# Patient Record
Sex: MALE
[De-identification: names, ages, dates, MRNs, and addresses within clinical notes are randomized per-mention and may not be internally consistent; named-entity substitution may affect disease eponyms.]

---

## 2017-04-23 ENCOUNTER — HOSPITAL (OUTPATIENT)
Dept: OTHER | Age: 62
End: 2017-04-23
Attending: EMERGENCY MEDICINE

## 2017-05-18 ENCOUNTER — HOSPITAL (OUTPATIENT)
Dept: OTHER | Age: 62
End: 2017-05-18
Attending: EMERGENCY MEDICINE

## 2017-06-05 ENCOUNTER — APPOINTMENT (OUTPATIENT)
Dept: GENERAL RADIOLOGY | Facility: HOSPITAL | Age: 62
End: 2017-06-05
Attending: EMERGENCY MEDICINE
Payer: MEDICAID

## 2017-06-05 ENCOUNTER — HOSPITAL ENCOUNTER (EMERGENCY)
Facility: HOSPITAL | Age: 62
Discharge: HOME | End: 2017-06-06
Attending: EMERGENCY MEDICINE
Payer: MEDICAID

## 2017-06-05 DIAGNOSIS — R07.89 CHEST PAIN, MUSCULOSKELETAL: Primary | ICD-10-CM

## 2017-06-05 PROCEDURE — 93005 ELECTROCARDIOGRAM TRACING: CPT

## 2017-06-05 PROCEDURE — 36415 COLL VENOUS BLD VENIPUNCTURE: CPT

## 2017-06-05 PROCEDURE — 80048 BASIC METABOLIC PNL TOTAL CA: CPT

## 2017-06-05 PROCEDURE — 84484 ASSAY OF TROPONIN QUANT: CPT

## 2017-06-05 PROCEDURE — 85025 COMPLETE CBC W/AUTO DIFF WBC: CPT

## 2017-06-05 PROCEDURE — 84484 ASSAY OF TROPONIN QUANT: CPT | Performed by: EMERGENCY MEDICINE

## 2017-06-05 PROCEDURE — 99285 EMERGENCY DEPT VISIT HI MDM: CPT

## 2017-06-05 PROCEDURE — 71010 XR CHEST AP PORTABLE  (CPT=71010): CPT | Performed by: EMERGENCY MEDICINE

## 2017-06-05 PROCEDURE — 93010 ELECTROCARDIOGRAM REPORT: CPT | Performed by: EMERGENCY MEDICINE

## 2017-06-05 RX ORDER — IBUPROFEN 600 MG/1
600 TABLET ORAL ONCE
Status: COMPLETED | OUTPATIENT
Start: 2017-06-05 | End: 2017-06-05

## 2017-06-06 VITALS
BODY MASS INDEX: 24.5 KG/M2 | WEIGHT: 175 LBS | DIASTOLIC BLOOD PRESSURE: 71 MMHG | HEIGHT: 71 IN | SYSTOLIC BLOOD PRESSURE: 110 MMHG | RESPIRATION RATE: 18 BRPM | TEMPERATURE: 97 F | OXYGEN SATURATION: 99 % | HEART RATE: 67 BPM

## 2017-06-06 RX ORDER — MELOXICAM 15 MG/1
15 TABLET ORAL DAILY
Qty: 15 TABLET | Refills: 0 | Status: SHIPPED | OUTPATIENT
Start: 2017-06-06 | End: 2017-06-21

## 2017-06-06 NOTE — ED INITIAL ASSESSMENT (HPI)
Chest pain that started at 7pm. Sternal no radiation. Pt has no cardiac hx. Denies sweating, nausea/vomiting or sob.

## 2017-06-06 NOTE — ED NOTES
Pt to ED for c/o mid sternal CP that began around 7pm while he was sitting down. He denies any exertion at the time of onset. He denies any radiation.  He is unsure of how long the CP lasted before it went away but states it came back shortly after it resol

## 2017-06-06 NOTE — ED NOTES
Pt presents to ED via steady gait with c/o mid sternal sharp chest pain while sitting today at 1900- states \"came out of no where. \" Denies sob, f/c, cough, diaphoresis- pt resting on ED cart in no distress. Skin pink warm dry.

## 2017-06-06 NOTE — ED NOTES
Patient to room 31 via wheelchair. Changing into gown and providing urine sample at this time. Blood work collected in triage.

## 2017-06-06 NOTE — ED PROVIDER NOTES
Patient Seen in: [facility] AND [facility] Emergency Department    History   Patient presents with:  Chest Pain Angina (cardiovascular)      HPI    The patient presents complaining of central chest pain that started around 7 PM tonight while he was sitting down 06/05/17 2127 97 %   O2 Device 06/05/17 2159 None (Room air)       Physical Exam   Constitutional: He is oriented to person, place, and time. He appears well-developed and well-nourished. No distress. HENT:   Head: Normocephalic and atraumatic.    Nose: N DRAW BLUE   RAINBOW DRAW GOLD      EKG    Rate, intervals and axes as noted on EKG Report.   Rate: Normal  Rhythm: Sinus Rhythm  Reading: Normal intervals, normal EKG            Preliminary Radiology Report  [facility], [facility]  [phone] - Phone tablet, R-0

## 2017-06-09 ENCOUNTER — HOSPITAL ENCOUNTER (EMERGENCY)
Facility: HOSPITAL | Age: 62
Discharge: HOME | End: 2017-06-10
Attending: EMERGENCY MEDICINE
Payer: MEDICAID

## 2017-06-09 VITALS
OXYGEN SATURATION: 97 % | RESPIRATION RATE: 20 BRPM | TEMPERATURE: 98 F | DIASTOLIC BLOOD PRESSURE: 67 MMHG | BODY MASS INDEX: 24.5 KG/M2 | WEIGHT: 175 LBS | HEIGHT: 71 IN | HEART RATE: 75 BPM | SYSTOLIC BLOOD PRESSURE: 107 MMHG

## 2017-06-09 DIAGNOSIS — S09.93XA DENTAL TRAUMA, INITIAL ENCOUNTER: Primary | ICD-10-CM

## 2017-06-09 PROCEDURE — 99283 EMERGENCY DEPT VISIT LOW MDM: CPT

## 2017-06-09 RX ORDER — TRAMADOL HYDROCHLORIDE 50 MG/1
50 TABLET ORAL EVERY 8 HOURS PRN
Qty: 15 TABLET | Refills: 0 | Status: SHIPPED | OUTPATIENT
Start: 2017-06-09 | End: 2017-06-14

## 2017-06-09 RX ORDER — PENICILLIN V POTASSIUM 500 MG/1
500 TABLET ORAL ONCE
Status: COMPLETED | OUTPATIENT
Start: 2017-06-09 | End: 2017-06-09

## 2017-06-09 RX ORDER — PENICILLIN V POTASSIUM 500 MG/1
500 TABLET ORAL 4 TIMES DAILY
Qty: 40 TABLET | Refills: 0 | Status: SHIPPED | OUTPATIENT
Start: 2017-06-09 | End: 2017-06-19

## 2017-06-10 NOTE — ED INITIAL ASSESSMENT (HPI)
Left lower tooth pain started tonight. Pt denies fever. Pt reports he has throat CA and is due for an operation this coming Monday. Obvious fractured tooth left lower side.

## 2017-06-10 NOTE — ED PROVIDER NOTES
Patient Seen in: [facility] AND [facility] Emergency Department    History   Patient presents with:  Dental Problem (dental)    Stated Complaint: tooth pain     HPI    65 yo M without PMH aside from upcoming thyroidectomy early next week presenting for evaluation DIAGNOSIS: After history and physical exam differential diagnosis was considered for dental fracture. Pulse ox: 99%:Normal on RA, as interpreted by myself    Evaluation for dental trauma without ingestion of same and without other traumatic complaints.

## 2021-01-11 NOTE — ED AVS SNAPSHOT
[facility] Emergency Department    [place] [address]     [address] [place] [address]    Phone:  [phone]    Fax:  [phone]           Mary Michel   MRN: [id number]    Department:  [facility] Emergency Department   Date of Visit:  6/9 Yes, ok for virtual visit. Thanks. and Class Registration line at [phone] or find a doctor online by visiting [email or web address].    IF THERE IS ANY CHANGE OR WORSENING OF YOUR CONDITION, CALL YOUR PRIMARY CARE PHYSICIAN AT ONCE OR RETURN IMMEDIATELY TO [address] [place] [address].     If

## 2025-01-13 ENCOUNTER — HOSPITAL ENCOUNTER (INPATIENT)
Facility: HOSPITAL | Age: 70
LOS: 2 days | Discharge: HOME | End: 2025-01-16
Attending: EMERGENCY MEDICINE | Admitting: STUDENT IN AN ORGANIZED HEALTH CARE EDUCATION/TRAINING PROGRAM
Payer: MEDICARE

## 2025-01-13 ENCOUNTER — APPOINTMENT (OUTPATIENT)
Dept: ULTRASOUND IMAGING | Facility: HOSPITAL | Age: 70
End: 2025-01-13
Attending: STUDENT IN AN ORGANIZED HEALTH CARE EDUCATION/TRAINING PROGRAM
Payer: MEDICARE

## 2025-01-13 ENCOUNTER — APPOINTMENT (OUTPATIENT)
Dept: GENERAL RADIOLOGY | Facility: HOSPITAL | Age: 70
End: 2025-01-13
Attending: EMERGENCY MEDICINE
Payer: MEDICARE

## 2025-01-13 ENCOUNTER — APPOINTMENT (OUTPATIENT)
Dept: CT IMAGING | Facility: HOSPITAL | Age: 70
End: 2025-01-13
Attending: EMERGENCY MEDICINE
Payer: MEDICARE

## 2025-01-13 DIAGNOSIS — I26.99 OTHER ACUTE PULMONARY EMBOLISM WITHOUT ACUTE COR PULMONALE (HCC): Primary | ICD-10-CM

## 2025-01-13 LAB
ANION GAP SERPL CALC-SCNC: 11 MMOL/L (ref 0–18)
APTT PPP: 32.6 SECONDS (ref 23–36)
BASOPHILS # BLD AUTO: 0.05 X10(3) UL (ref 0–0.2)
BASOPHILS NFR BLD AUTO: 0.3 %
BUN BLD-MCNC: 24 MG/DL (ref 9–23)
BUN/CREAT SERPL: 21.4 (ref 10–20)
CALCIUM BLD-MCNC: 9.3 MG/DL (ref 8.7–10.4)
CHLORIDE SERPL-SCNC: 103 MMOL/L (ref 98–112)
CO2 SERPL-SCNC: 24 MMOL/L (ref 21–32)
CREAT BLD-MCNC: 1.12 MG/DL
D DIMER PPP FEU-MCNC: 2.95 UG/ML FEU (ref ?–0.69)
DEPRECATED RDW RBC AUTO: 41.4 FL (ref 35.1–46.3)
EGFRCR SERPLBLD CKD-EPI 2021: 71 ML/MIN/1.73M2 (ref 60–?)
EOSINOPHIL # BLD AUTO: 0.1 X10(3) UL (ref 0–0.7)
EOSINOPHIL NFR BLD AUTO: 0.7 %
ERYTHROCYTE [DISTWIDTH] IN BLOOD BY AUTOMATED COUNT: 13.1 % (ref 11–15)
FLUAV + FLUBV RNA SPEC NAA+PROBE: NEGATIVE
FLUAV + FLUBV RNA SPEC NAA+PROBE: NEGATIVE
GLUCOSE BLD-MCNC: 101 MG/DL (ref 70–99)
HCT VFR BLD AUTO: 41.9 %
HGB BLD-MCNC: 14.7 G/DL
IMM GRANULOCYTES # BLD AUTO: 0.17 X10(3) UL (ref 0–1)
IMM GRANULOCYTES NFR BLD: 1.2 %
LYMPHOCYTES # BLD AUTO: 2.34 X10(3) UL (ref 1–4)
LYMPHOCYTES NFR BLD AUTO: 16.3 %
MCH RBC QN AUTO: 30.5 PG (ref 26–34)
MCHC RBC AUTO-ENTMCNC: 35.1 G/DL (ref 31–37)
MCV RBC AUTO: 86.9 FL
MONOCYTES # BLD AUTO: 1.78 X10(3) UL (ref 0.1–1)
MONOCYTES NFR BLD AUTO: 12.4 %
NEUTROPHILS # BLD AUTO: 9.96 X10 (3) UL (ref 1.5–7.7)
NEUTROPHILS # BLD AUTO: 9.96 X10(3) UL (ref 1.5–7.7)
NEUTROPHILS NFR BLD AUTO: 69.1 %
OSMOLALITY SERPL CALC.SUM OF ELEC: 290 MOSM/KG (ref 275–295)
PLATELET # BLD AUTO: 265 10(3)UL (ref 150–450)
POTASSIUM SERPL-SCNC: 3.5 MMOL/L (ref 3.5–5.1)
RBC # BLD AUTO: 4.82 X10(6)UL
RSV RNA SPEC NAA+PROBE: NEGATIVE
SARS-COV-2 RNA RESP QL NAA+PROBE: NOT DETECTED
SODIUM SERPL-SCNC: 138 MMOL/L (ref 136–145)
TROPONIN I SERPL HS-MCNC: <3 NG/L
WBC # BLD AUTO: 14.4 X10(3) UL (ref 4–11)

## 2025-01-13 PROCEDURE — 99223 1ST HOSP IP/OBS HIGH 75: CPT | Performed by: STUDENT IN AN ORGANIZED HEALTH CARE EDUCATION/TRAINING PROGRAM

## 2025-01-13 PROCEDURE — 93970 EXTREMITY STUDY: CPT | Performed by: STUDENT IN AN ORGANIZED HEALTH CARE EDUCATION/TRAINING PROGRAM

## 2025-01-13 PROCEDURE — 71045 X-RAY EXAM CHEST 1 VIEW: CPT | Performed by: EMERGENCY MEDICINE

## 2025-01-13 PROCEDURE — 71260 CT THORAX DX C+: CPT | Performed by: EMERGENCY MEDICINE

## 2025-01-13 RX ORDER — PREDNISONE 20 MG/1
60 TABLET ORAL ONCE
Status: COMPLETED | OUTPATIENT
Start: 2025-01-13 | End: 2025-01-13

## 2025-01-13 RX ORDER — IPRATROPIUM BROMIDE AND ALBUTEROL SULFATE 2.5; .5 MG/3ML; MG/3ML
3 SOLUTION RESPIRATORY (INHALATION) ONCE
Status: COMPLETED | OUTPATIENT
Start: 2025-01-13 | End: 2025-01-13

## 2025-01-13 RX ORDER — HEPARIN SODIUM AND DEXTROSE 10000; 5 [USP'U]/100ML; G/100ML
18 INJECTION INTRAVENOUS ONCE
Status: COMPLETED | OUTPATIENT
Start: 2025-01-13 | End: 2025-01-14

## 2025-01-13 RX ORDER — HEPARIN SODIUM 1000 [USP'U]/ML
80 INJECTION, SOLUTION INTRAVENOUS; SUBCUTANEOUS ONCE
Status: COMPLETED | OUTPATIENT
Start: 2025-01-13 | End: 2025-01-13

## 2025-01-13 RX ORDER — HEPARIN SODIUM AND DEXTROSE 10000; 5 [USP'U]/100ML; G/100ML
INJECTION INTRAVENOUS CONTINUOUS
Status: DISCONTINUED | OUTPATIENT
Start: 2025-01-14 | End: 2025-01-15

## 2025-01-14 ENCOUNTER — APPOINTMENT (OUTPATIENT)
Dept: CV DIAGNOSTICS | Facility: HOSPITAL | Age: 70
End: 2025-01-14
Attending: STUDENT IN AN ORGANIZED HEALTH CARE EDUCATION/TRAINING PROGRAM
Payer: MEDICARE

## 2025-01-14 PROBLEM — I26.99 OTHER ACUTE PULMONARY EMBOLISM WITHOUT ACUTE COR PULMONALE (HCC): Status: ACTIVE | Noted: 2025-01-14

## 2025-01-14 PROBLEM — I26.99 PULMONARY EMBOLISM (HCC): Status: ACTIVE | Noted: 2025-01-14

## 2025-01-14 LAB
ANION GAP SERPL CALC-SCNC: 12 MMOL/L (ref 0–18)
APTT PPP: 114.4 SECONDS (ref 23–36)
APTT PPP: 57.1 SECONDS (ref 23–36)
APTT PPP: 62 SECONDS (ref 23–36)
ATRIAL RATE: 92 BPM
BASOPHILS # BLD AUTO: 0.04 X10(3) UL (ref 0–0.2)
BASOPHILS NFR BLD AUTO: 0.3 %
BUN BLD-MCNC: 24 MG/DL (ref 9–23)
BUN/CREAT SERPL: 22.4 (ref 10–20)
CALCIUM BLD-MCNC: 9.5 MG/DL (ref 8.7–10.4)
CHLORIDE SERPL-SCNC: 104 MMOL/L (ref 98–112)
CO2 SERPL-SCNC: 25 MMOL/L (ref 21–32)
CREAT BLD-MCNC: 1.07 MG/DL
DEPRECATED RDW RBC AUTO: 41 FL (ref 35.1–46.3)
EGFRCR SERPLBLD CKD-EPI 2021: 75 ML/MIN/1.73M2 (ref 60–?)
EOSINOPHIL # BLD AUTO: 0 X10(3) UL (ref 0–0.7)
EOSINOPHIL NFR BLD AUTO: 0 %
ERYTHROCYTE [DISTWIDTH] IN BLOOD BY AUTOMATED COUNT: 13.2 % (ref 11–15)
GLUCOSE BLD-MCNC: 145 MG/DL (ref 70–99)
HCT VFR BLD AUTO: 40.9 %
HCT VFR BLD AUTO: 41.6 %
HGB BLD-MCNC: 14.3 G/DL
HGB BLD-MCNC: 14.4 G/DL
IMM GRANULOCYTES # BLD AUTO: 0.12 X10(3) UL (ref 0–1)
IMM GRANULOCYTES NFR BLD: 1 %
LYMPHOCYTES # BLD AUTO: 1.13 X10(3) UL (ref 1–4)
LYMPHOCYTES NFR BLD AUTO: 9.3 %
MCH RBC QN AUTO: 29.2 PG (ref 26–34)
MCHC RBC AUTO-ENTMCNC: 34.4 G/DL (ref 31–37)
MCV RBC AUTO: 84.9 FL
MONOCYTES # BLD AUTO: 0.42 X10(3) UL (ref 0.1–1)
MONOCYTES NFR BLD AUTO: 3.5 %
NEUTROPHILS # BLD AUTO: 10.4 X10 (3) UL (ref 1.5–7.7)
NEUTROPHILS # BLD AUTO: 10.4 X10(3) UL (ref 1.5–7.7)
NEUTROPHILS NFR BLD AUTO: 85.9 %
OSMOLALITY SERPL CALC.SUM OF ELEC: 299 MOSM/KG (ref 275–295)
P AXIS: 21 DEGREES
P-R INTERVAL: 154 MS
PLATELET # BLD AUTO: 304 10(3)UL (ref 150–450)
POTASSIUM SERPL-SCNC: 3.8 MMOL/L (ref 3.5–5.1)
Q-T INTERVAL: 398 MS
QRS DURATION: 136 MS
QTC CALCULATION (BEZET): 492 MS
R AXIS: -26 DEGREES
RBC # BLD AUTO: 4.9 X10(6)UL
SODIUM SERPL-SCNC: 141 MMOL/L (ref 136–145)
T AXIS: 1 DEGREES
TSI SER-ACNC: 1.86 UIU/ML (ref 0.55–4.78)
VENTRICULAR RATE: 92 BPM
WBC # BLD AUTO: 12.1 X10(3) UL (ref 4–11)

## 2025-01-14 PROCEDURE — 99233 SBSQ HOSP IP/OBS HIGH 50: CPT | Performed by: FAMILY MEDICINE

## 2025-01-14 PROCEDURE — 93306 TTE W/DOPPLER COMPLETE: CPT | Performed by: STUDENT IN AN ORGANIZED HEALTH CARE EDUCATION/TRAINING PROGRAM

## 2025-01-14 PROCEDURE — 99223 1ST HOSP IP/OBS HIGH 75: CPT | Performed by: INTERNAL MEDICINE

## 2025-01-14 RX ORDER — POTASSIUM CHLORIDE 1500 MG/1
40 TABLET, EXTENDED RELEASE ORAL ONCE
Status: COMPLETED | OUTPATIENT
Start: 2025-01-14 | End: 2025-01-14

## 2025-01-14 RX ORDER — IPRATROPIUM BROMIDE AND ALBUTEROL SULFATE 2.5; .5 MG/3ML; MG/3ML
3 SOLUTION RESPIRATORY (INHALATION) EVERY 6 HOURS PRN
Status: DISCONTINUED | OUTPATIENT
Start: 2025-01-14 | End: 2025-01-16

## 2025-01-14 RX ORDER — LEVOTHYROXINE SODIUM 112 UG/1
112 TABLET ORAL
Status: DISCONTINUED | OUTPATIENT
Start: 2025-01-14 | End: 2025-01-16

## 2025-01-14 RX ORDER — LEVOTHYROXINE SODIUM 112 UG/1
112 TABLET ORAL
COMMUNITY

## 2025-01-14 RX ORDER — ACETAMINOPHEN 325 MG/1
650 TABLET ORAL EVERY 6 HOURS PRN
Status: DISCONTINUED | OUTPATIENT
Start: 2025-01-14 | End: 2025-01-16

## 2025-01-14 RX ORDER — ONDANSETRON 2 MG/ML
4 INJECTION INTRAMUSCULAR; INTRAVENOUS EVERY 6 HOURS PRN
Status: DISCONTINUED | OUTPATIENT
Start: 2025-01-14 | End: 2025-01-16

## 2025-01-14 NOTE — ED PROVIDER NOTES
Patient Seen in: [facility] Emergency Department    History     Chief Complaint   Patient presents with    Cough/URI       HPI    69-year-old male who comes to the emergency department here with 3 days of mild dyspnea, cough with hemoptysis, reports mild chest pain that is right-sided that is pleuritic.  Denies any lower extremity edema.  Currently living at a hotel.    History reviewed.   Past Medical History:    Cancer (HCC)    throat    Esophageal reflux       History reviewed. History reviewed. No pertinent surgical history.      Medications :  Prescriptions Prior to Admission[1]     History reviewed. No pertinent family history.    Smoking Status:   Social History     Socioeconomic History    Marital status: Single   Tobacco Use    Smoking status: Never       Constitutional and vital signs reviewed.      Social History and Family History elements reviewed from today, pertinent positives to the presenting problem noted.    Physical Exam     ED Triage Vitals [01/13/25 1953]   /80   Pulse 112   Resp 18   Temp 97 °F (36.1 °C)   Temp src Temporal   SpO2 94 %   O2 Device None (Room air)       All measures to prevent infection transmission during my interaction with the patient were taken. The patient was already wearing a droplet mask on my arrival to the room. Personal protective equipment was worn throughout the duration of the exam.  Handwashing was performed prior to and after the exam.  Stethoscope and any equipment used during my examination was cleaned with super sani-cloth germicidal wipes following the exam.     Physical Exam    General: NAD  Head: Normocephalic and atraumatic.  Mouth/Throat/Ears/Nose: Oropharynx is clear and moist.   Eyes: Conjunctivae and EOM are normal.   Neck: Normal range of motion. Supple.   Cardiovascular: Normal rate, regular rhythm   Respiratory/Chest: wheezing, no tachypnea. Exhibits no tenderness.  Gastrointestinal: Soft, non-tender, non-distended. Bowel sounds are  normal.   Musculoskeletal:No swelling or deformity.   Neurological: Alert and appropriate. No focal deficits.   Skin: Skin is warm and dry. No pallor.  Psychiatric: Has a normal mood and affect.      ED Course        Labs Reviewed   BASIC METABOLIC PANEL (8) - Abnormal; Notable for the following components:       Result Value    Glucose 101 (*)     BUN 24 (*)     BUN/CREA Ratio 21.4 (*)     All other components within normal limits   CBC WITH DIFFERENTIAL WITH PLATELET - Abnormal; Notable for the following components:    WBC 14.4 (*)     Neutrophil Absolute Prelim 9.96 (*)     Neutrophil Absolute 9.96 (*)     Monocyte Absolute 1.78 (*)     All other components within normal limits   D-DIMER - Abnormal; Notable for the following components:    D-Dimer 2.95 (*)     All other components within normal limits   TROPONIN I HIGH SENSITIVITY - Normal   SARS-COV-2/FLU A AND B/RSV BY PCR (GENEXPERT) - Normal    Narrative:     This test is intended for the qualitative detection and differentiation of SARS-CoV-2, influenza A, influenza B, and respiratory syncytial virus (RSV) viral RNA in nasopharyngeal or nares swabs from individuals suspected of respiratory viral infection consistent with COVID-19 by their healthcare provider. Signs and symptoms of respiratory viral infection due to SARS-CoV-2, influenza, and RSV can be similar.                                    Test performed using the Xpert Xpress SARS-CoV-2/FLU/RSV (real time RT-PCR)  assay on the GeneXpert instrument, [employer], [place] [address].                   This test is being used under the Food and Drug Administration's Emergency Use Authorization.                                    The authorized Fact Sheet for Healthcare Providers for this assay is available upon request from the laboratory.   PTT, ACTIVATED   BASIC METABOLIC PANEL (8)   CBC WITH DIFFERENTIAL WITH PLATELET     EKG     Rate, intervals and axes as noted on EKG Report.  Rate: 92  Rhythm: Sinus  Rhythm  Reading: No STEMI.  Right bundle branch block.           As Interpreted by me    Imaging Results Available and Reviewed while in ED: No results found.  ED Medications Administered:   Medications   heparin (Porcine) 1000 UNIT/ML injection - BOLUS IV 7,300 Units (has no administration in time range)   heparin (Porcine) 74802 units/250 mL infusion ED (PE/DVT/THROMBUS) INITIAL DOSE (has no administration in time range)   heparin (Porcine) 34481 units/250mL infusion PE/DVT/THROMBUS CONTINUOUS (has no administration in time range)   ipratropium-albuterol (Duoneb) 0.5-2.5 (3) MG/3ML inhalation solution 3 mL (3 mL Nebulization Given 1/13/25 2104)   predniSONE (Deltasone) tab 60 mg (60 mg Oral Given 1/13/25 2117)   iopamidol 76% (ISOVUE-370) injection for power injector (80 mL Intravenous Given 1/13/25 2219)         MDM     Vitals:    01/13/25 1953   BP: 123/80   Pulse: 112   Resp: 18   Temp: 97 °F (36.1 °C)   TempSrc: Temporal   SpO2: 94%   Weight: 90.7 kg   Height: 180.3 cm (5' 11\")     *I personally reviewed and interpreted all ED vitals.    Pulse Ox: 94%, Room air, Normal     Monitor Interpretation:   normal sinus rhythm as interpreted by me.  The cardiac monitor was ordered given chest pain.      Medical Decision Making      Differential Diagnosis/ Diagnostic Considerations: Bronchitis, pulmonary embolus, COVID-19    Complicating Factors: The patient already has does not have any pertinent problems on file. to contribute to the complexity of this ED evaluation.    I reviewed prior chart records including office note from December 6, 2024.  Patient here with wheezing on exam, provided with DuoNebs and prednisone and feels improved however I was  concerned about the right-sided chest pain and hemoptysis, D-dimer elevated on my interpretation of the lab work, CT PE scan notable for right segmental pulmonary embolus with pulmonary infarct.  There is no evidence of right heart strain according to my discussion with  .  Troponin negative.  Admitted to and discussed with . Consulted Dr. Keen, starting heparin drip.  Chest x-ray without pneumothorax on my interpretation.    Admitted In stable condition.  Patient is comfortable with the plan.       I spent 30 minutes of critical care time caring for this patient. This does not include time spent on separately reported billable procedures.       Preliminary Radiology Report  [facility], [facility]     CTA CHEST WITH CONTRAST (PE STUDY)     COMPARISON: None.     IMPRESSION:  Acute pulmonary emboli segmental and subsegmental right lower lobe.  No large central pulmonary embolus.  Motion/artifact intermittently degrade segmental and subsegmental evaluation.     No definitive evidence of heart strain. Enlarged heart.  No pericardial effusion.    Lateral right lower lobe pulmonary infarct with small right pleural effusion and basilar atelectasis.      Left basilar atelectasis.    No pneumothorax.    No acute aortic process given limitations of motion.    Enlarged steatotic liver.    Case discussed with Dr. Cat at 11:41 PM ET.    Brijesh Bullock MD/PhD      Disposition and Plan     Clinical Impression:  1. Other acute pulmonary embolism without acute cor pulmonale (HCC)        Disposition:  Admit    Follow-up:  No follow-up provider specified.    Medications Prescribed:  There are no discharge medications for this patient.      Hospital Problems       Present on Admission             ICD-10-CM Noted POA    Pulmonary embolism (HCC) I26.99 1/13/2025 Unknown                       [1] (Not in a hospital admission)

## 2025-01-14 NOTE — PLAN OF CARE
Problem: Patient Centered Care  Goal: Patient preferences are identified and integrated in the patient's plan of care  Description: Interventions:  - What would you like us to know as we care for you?   - Provide timely, complete, and accurate information to patient/family  - Incorporate patient and family knowledge, values, beliefs, and cultural backgrounds into the planning and delivery of care  - Encourage patient/family to participate in care and decision-making at the level they choose  - Honor patient and family perspectives and choices  Outcome: Progressing     Problem: Patient/Family Goals  Goal: Patient/Family Long Term Goal  Description: Patient's Long Term Goal: Pulmonary embolism resolved.    Interventions:  - Heparin drip infusing per protocol.  - Pulmonary on consult.  - PTT levels are monitored.   - See additional Care Plan goals for specific interventions  Outcome: Progressing  Goal: Patient/Family Short Term Goal  Description: Patient's Short Term Goal: Safe and free from falls.    Interventions:   - Fall risk precautions are followed at all times.   - Non-skid socks are worn at all times.  - Call light within reach at all times.  - Bed and chair alarm remains in use at all times.   - See additional Care Plan goals for specific interventions  Outcome: Progressing     Problem: RESPIRATORY - ADULT  Goal: Achieves optimal ventilation and oxygenation  Description: INTERVENTIONS:  - Assess for changes in respiratory status  - Assess for changes in mentation and behavior  - Position to facilitate oxygenation and minimize respiratory effort  - Oxygen supplementation based on oxygen saturation or ABGs  - Provide Smoking Cessation handout, if applicable  - Encourage broncho-pulmonary hygiene including cough, deep breathe, Incentive Spirometry  - Assess the need for suctioning and perform as needed  - Assess and instruct to report SOB or any respiratory difficulty  - Respiratory Therapy support as  indicated  - Manage/alleviate anxiety  - Monitor for signs/symptoms of CO2 retention  Outcome: Progressing     Problem: HEMATOLOGIC - ADULT  Goal: Maintains hematologic stability  Description: INTERVENTIONS  - Assess for signs and symptoms of bleeding or hemorrhage  - Monitor labs and vital signs for trends  - Administer supportive blood products/factors, fluids and medications as ordered and appropriate  - Administer supportive blood products/factors as ordered and appropriate  Outcome: Progressing  Goal: Free from bleeding injury  Description: (Example usage: patient with low platelets)  INTERVENTIONS:  - Avoid intramuscular injections, enemas and rectal medication administration  - Ensure safe mobilization of patient  - Hold pressure on venipuncture sites to achieve adequate hemostasis  - Assess for signs and symptoms of internal bleeding  - Monitor lab trends  - Patient is to report abnormal signs of bleeding to staff  - Avoid use of toothpicks and dental floss  - Use electric shaver for shaving  - Use soft bristle tooth brush  - Limit straining and forceful nose blowing  Outcome: Progressing     Problem: SAFETY ADULT - FALL  Goal: Free from fall injury  Description: INTERVENTIONS:  - Assess pt frequently for physical needs  - Identify cognitive and physical deficits and behaviors that affect risk of falls.  - [place] fall precautions as indicated by assessment.  - Educate pt/family on patient safety including physical limitations  - Instruct pt to call for assistance with activity based on assessment  - Modify environment to reduce risk of injury  - Provide assistive devices as appropriate  - Consider OT/PT consult to assist with strengthening/mobility  - Encourage toileting schedule  Outcome: Progressing     Patient is presently resting in the bed. Alert x 4. Vital signs and labs are monitored. Patient denied pain, shortness of breath or discomfort. Heparin drip is infusing at current rate per protocol.  Heparin Pulmonary/DVT protocol is utilized per order. Pulmonary on consult. Tolerates diet well. Call light within reach at all times.

## 2025-01-14 NOTE — PROGRESS NOTES
History and Physical     Phoenix Luna Patient Status:  Emergency    1955 MRN [id number]   Location [facility] EMERGENCY DEPARTMENT Attending Raven Clement MD   Hosp Day # 0 PCP Puma Wright     Chief Complaint: Hemoptysis, cough    Subjective:    Phoenix Luna is a 69 year old male with PMH of hypothyroidism, basal cell carcinoma, arthritis who presented with hemoptysis X 3 days.  Stated he was coughing up small amounts of blood along with right-sided chest pain, but thought it was muscle strain due to weight lifting, and thought maybe he got sick from the gym.  At baseline patient states he is sedentary spending most of his time on the bed or couch due to severe osteoarthritic hip pain.  States he compensates for his immobility by going to the gym daily and lifting weights upper body.  Denies smoking, recent travel.  Was supposed to see dermatology for basal cell carcinoma on his forehead.  Uses a walker.  Is currently living in a hotel because he is in between houses, and not because he is homeless he states.   In ED, tachycardic .  Patient had wheezing on exam was given DuoNebs prednisone with initial concern for bronchitis.  However D-dimer elevated and CT PE revealed segmental PE with pulmonary infarct.  Heparin drip started.  Due to hemoptysis felt uncomfortable sending him home on DOAC, so admitted to medical team with pulmonary consult.         History/Other:      Past Medical History:  Past Medical History:    Cancer (HCC)    throat    Esophageal reflux        Past Surgical History: History reviewed. No pertinent surgical history.    Social History:  reports that he has never smoked. He does not have any smokeless tobacco history on file.    Family History: History reviewed. No pertinent family history.    Allergies: Allergies[1]    Medications:  Medications Ordered Prior to Encounter[2]    Review of Systems:   A comprehensive 14 point review of systems was completed.     Pertinent positives and negatives noted in the HPI.    Objective:     /80   Pulse 112   Temp 97 °F (36.1 °C) (Temporal)   Resp 18   Ht 5' 11\" (1.803 m)   Wt 200 lb (90.7 kg)   SpO2 94%   BMI 27.89 kg/m²   General: No acute distress.  Alert ,       speaking in full sentences without distress  HEENT: MMM  Respiratory: Clear to auscultation bilaterally. No wheezes. No rhonchi.  Cardiovascular: RRR  Abdomen: Soft, nontender, nondistended.  Positive bowel sounds. No rebound, guarding or organomegaly.  Neurologic: No focal neurological deficits.  Musculoskeletal: Moves all extremities.  Extremities: No edema or cyanosis.  Integument: No rashes or lesions.       Results:      Labs:  Labs Last 24 Hours:   BMP     CBC    Other     Na 138 Cl 103 BUN 24 Glu 101   Hb 14.7   PTT - Procal -   K 3.5 CO2 24.0 Cr 1.12   WBC 14.4 >< .0  INR - CRP -   Renal Lytes Endo    Hct 41.9   Trop - D dim 2.95   eGFR - Ca 9.3 POC Gluc  -    LFT   pBNP - Lactic -   eGFR AA - PO4 - A1c -   AST - APk - Prot -  LDL -     Mg - TSH -   ALT - T liliana - Alb -          COVID-19 Lab Results    COVID-19  Lab Results   Component Value Date    COVID19 Not Detected 01/13/2025       Pro-Calcitonin  No results for input(s): \"PCT\" in the last 168 hours.    Cardiac  No results for input(s): \"TROP\", \"PBNP\" in the last 168 hours.    Creatinine Kinase  No results for input(s): \"CK\" in the last 168 hours.    Inflammatory Markers  Recent Labs   Lab 01/13/25 2127   DDIMER 2.95*       Imaging: Imaging data reviewed in Epic.    Assessment & Plan:      #Pulmonary Embolism  -Patient with cough, SOB, and elevated ddimer. CT PE confirming segmental PE with pulm infarct  - remains hemodynamically stable, EKG with RBBB, troponin not elevated  - started on heparin gtt in ED   - pulm crit consulted  - ECHO, LE venous ultrasound ordered  - Due to hemoptysis felt unsafe discharging on DOAC, admit to tele       Chronic Problems   Hypothyroidism  Basal cell  carcinoma   Osteoarthritis      DVT Prophylaxis: heparin   Diet: regular   CODE status: FULL           >75 minutes spent on this admission - examining patient, obtaining history, reviewing previous medical records, going over test results/imaging and discussing plan of care. All questions answered.     Raven Clement MD  1/13/2025           [1] No Known Allergies  [2]   No current facility-administered medications on file prior to encounter.     No current outpatient medications on file prior to encounter.

## 2025-01-14 NOTE — PLAN OF CARE
Problem: RESPIRATORY - ADULT  Goal: Achieves optimal ventilation and oxygenation  Description: INTERVENTIONS:  - Assess for changes in respiratory status  - Assess for changes in mentation and behavior  - Position to facilitate oxygenation and minimize respiratory effort  - Oxygen supplementation based on oxygen saturation or ABGs  - Provide Smoking Cessation handout, if applicable  - Encourage broncho-pulmonary hygiene including cough, deep breathe, Incentive Spirometry  - Assess the need for suctioning and perform as needed  - Assess and instruct to report SOB or any respiratory difficulty  - Respiratory Therapy support as indicated  - Manage/alleviate anxiety  - Monitor for signs/symptoms of CO2 retention  Outcome: Progressing     Problem: HEMATOLOGIC - ADULT  Goal: Maintains hematologic stability  Description: INTERVENTIONS  - Assess for signs and symptoms of bleeding or hemorrhage  - Monitor labs and vital signs for trends  - Administer supportive blood products/factors, fluids and medications as ordered and appropriate  - Administer supportive blood products/factors as ordered and appropriate  Outcome: Progressing  Goal: Free from bleeding injury  Description: (Example usage: patient with low platelets)  INTERVENTIONS:  - Avoid intramuscular injections, enemas and rectal medication administration  - Ensure safe mobilization of patient  - Hold pressure on venipuncture sites to achieve adequate hemostasis  - Assess for signs and symptoms of internal bleeding  - Monitor lab trends  - Patient is to report abnormal signs of bleeding to staff  - Avoid use of toothpicks and dental floss  - Use electric shaver for shaving  - Use soft bristle tooth brush  - Limit straining and forceful nose blowing  Outcome: Progressing     Problem: SAFETY ADULT - FALL  Goal: Free from fall injury  Description: INTERVENTIONS:  - Assess pt frequently for physical needs  - Identify cognitive and physical deficits and behaviors that  affect risk of falls.  - [place] fall precautions as indicated by assessment.  - Educate pt/family on patient safety including physical limitations  - Instruct pt to call for assistance with activity based on assessment  - Modify environment to reduce risk of injury  - Provide assistive devices as appropriate  - Consider OT/PT consult to assist with strengthening/mobility  - Encourage toileting schedule  Outcome: Progressing

## 2025-01-14 NOTE — CONSULTS
[facility]  part of [facility]    Consult Note    Date:  2025  Date of Admission:  2025    Chief Complaint:   Phoenix Luna is a(n) 69 year old male with pulmonary embolism.    HPI:   The patient is a family history of venous thromboembolism in his mother.  He had a hip replacement surgery last April.  He developed a flu syndrome over the last week.  He tells me that he was trying to exercise more vigorously with heated exercise room to try to defeat the virus.  However, he became more weak over the past 3 days and was essentially bedbound.  On 3 occasions he coughed up chunks of red blood and he showed me pictures of what he had brought up.  He has developed right-sided chest pain which is pleuritic in character.  He denies lower extremity symptomatology and there is no cramping or swelling.  He came to the emergency room for these concerns and was found to have pulmonary embolism.  He has a history of skin cancer which was not melanoma as well as a prior history of thyroid cancer.    History     Past Medical History:    Cancer (HCC)    throat    Esophageal reflux     History reviewed. No pertinent surgical history.    Family history-mother alive and well at 98, father  peptic ulcer disease.    Social History: Single, no kids, no tobacco, occasional alcohol, buys and sells cars  Social History     Socioeconomic History    Marital status: Single   Tobacco Use    Smoking status: Never     Social Drivers of Health     Food Insecurity: No Food Insecurity (2025)    Food Insecurity     Food Insecurity: Never true   Transportation Needs: No Transportation Needs (2025)    Transportation Needs     Lack of Transportation: No   Housing Stability: Low Risk  (2025)    Housing Stability     Housing Instability: No     Allergies/Medications:   Allergies: Allergies[1]  Medications Prior to Admission   Medication Sig    levothyroxine 112 MCG Oral Tab Take 1 tablet (112 mcg total)  by mouth before breakfast.       Review of Systems:   Review of Systems:  Vision normal. Ear nose and throat normal. Bowel normal. Bladder function normal. No depression. No thyroid disease. No lymphatic system concerns.  No rash. Muscles and joints unremarkable. No weight loss no weight gain.    Physical Exam:   Vital Signs:  Blood pressure 114/77, pulse 83, temperature 97.8 °F (36.6 °C), temperature source Oral, resp. rate 16, height 5' 11\" (1.803 m), weight 191 lb (86.6 kg), SpO2 94%.     Alert white male  HEENT examination is unremarkable with pupils equal round and reactive to light and accommodation.   Neck without adenopathy, thyromegaly, JVD nor bruit.   Lungs diminished to auscultation and percussion.  Cardiac regular rate and rhythm no murmur.   Abdomen nontender, without hepatosplenomegaly and no mass appreciable.   Extremities without clubbing cyanosis nor edema.  No palpable cord.  Neurologic grossly intact with symmetric tone and strength and reflex.  Skin without gross abnormality    Results:     Lab Results   Component Value Date    WBC 12.1 01/14/2025    HGB 14.3 01/14/2025    HCT 41.6 01/14/2025    .0 01/14/2025    CREATSERUM 1.07 01/14/2025    BUN 24 01/14/2025     01/14/2025    K 3.8 01/14/2025     01/14/2025    CO2 25.0 01/14/2025     01/14/2025    CA 9.5 01/14/2025    PTT 57.1 01/14/2025    TSH 1.860 01/13/2025    DDIMER 2.95 01/13/2025     Lower extremity venous ultrasound-negative    CT scan of the chest-1. Acute right lower lobar, segmental, and subsegmental pulmonary emboli.      2. Patchy airspace opacities in the right lower lobe may reflect developing pulmonary infarction.       3. Small right larger than left pleural effusions and associated basilar atelectasis, with or without superimposed pneumonia.      4. Dilatation of the main pulmonary artery trunk may relate to underlying pulmonary hypertension.         5. Possible mild pulmonary interstitial edema.       6. Hepatic steatosis.          Chest x-ray-subtle haziness at right lung base    Assessment/Plan:   1.  Pulmonary embolism-family history of PE and the patient had been bedbound with the flu.  Had associated hemoptysis and pleurisy.  The patient showed me pictures of what he had packed up with the hemoptysis.  I would be hesitant to give tPA and the patient with hemoptysis.    Recommendations:  1.  Heparin today  2.  If no significant further hemoptysis, would transition to Xarelto tomorrow.  3.  Await echo as ordered although again I would be hesitant to give tPA.  4.  Will follow clinically.    2.  History of thyroid cancer    I am delighted to assist with Phoenix's care.      Rajiv Keen MD  Medical Director, Critical Care, [facility]  Medical Director, [facility]  Pager: [phone]             [1] No Known Allergies

## 2025-01-14 NOTE — ED INITIAL ASSESSMENT (HPI)
To ED with c/o flu like symptoms for the past couple of days. Patient states he was working out and then started to cough up blood.    +CP  +SOB with exertion

## 2025-01-14 NOTE — PLAN OF CARE
Problem: Patient Centered Care  Goal: Patient preferences are identified and integrated in the patient's plan of care  Description: Interventions:  - What would you like us to know as we care for you? From home alone   - Provide timely, complete, and accurate information to patient/family  - Incorporate patient and family knowledge, values, beliefs, and cultural backgrounds into the planning and delivery of care  - Encourage patient/family to participate in care and decision-making at the level they choose  - Honor patient and family perspectives and choices  Outcome: Progressing     Problem: Patient/Family Goals  Goal: Patient/Family Long Term Goal  Description: Patient's Long Term Goal: return home     Interventions:  -   - See additional Care Plan goals for specific interventions  Outcome: Progressing  Goal: Patient/Family Short Term Goal  Description: Patient's Short Term Goal:     Interventions:   -   - See additional Care Plan goals for specific interventions  Outcome: Progressing     Problem: RESPIRATORY - ADULT  Goal: Achieves optimal ventilation and oxygenation  Description: INTERVENTIONS:  - Assess for changes in respiratory status  - Assess for changes in mentation and behavior  - Position to facilitate oxygenation and minimize respiratory effort  - Oxygen supplementation based on oxygen saturation or ABGs  - Provide Smoking Cessation handout, if applicable  - Encourage broncho-pulmonary hygiene including cough, deep breathe, Incentive Spirometry  - Assess the need for suctioning and perform as needed  - Assess and instruct to report SOB or any respiratory difficulty  - Respiratory Therapy support as indicated  - Manage/alleviate anxiety  - Monitor for signs/symptoms of CO2 retention  1/14/2025 0729 by Christa Kennedy, RN  Outcome: Progressing  1/14/2025 0249 by Christa Kennedy, RN  Outcome: Progressing     Problem: HEMATOLOGIC - ADULT  Goal: Maintains hematologic stability  Description:  INTERVENTIONS  - Assess for signs and symptoms of bleeding or hemorrhage  - Monitor labs and vital signs for trends  - Administer supportive blood products/factors, fluids and medications as ordered and appropriate  - Administer supportive blood products/factors as ordered and appropriate  1/14/2025 0729 by Christa Kennedy RN  Outcome: Progressing  1/14/2025 0249 by Christa Kennedy RN  Outcome: Progressing  Goal: Free from bleeding injury  Description: (Example usage: patient with low platelets)  INTERVENTIONS:  - Avoid intramuscular injections, enemas and rectal medication administration  - Ensure safe mobilization of patient  - Hold pressure on venipuncture sites to achieve adequate hemostasis  - Assess for signs and symptoms of internal bleeding  - Monitor lab trends  - Patient is to report abnormal signs of bleeding to staff  - Avoid use of toothpicks and dental floss  - Use electric shaver for shaving  - Use soft bristle tooth brush  - Limit straining and forceful nose blowing  1/14/2025 0729 by Christa Kennedy RN  Outcome: Progressing  1/14/2025 0249 by Christa Kennedy RN  Outcome: Progressing     Problem: SAFETY ADULT - FALL  Goal: Free from fall injury  Description: INTERVENTIONS:  - Assess pt frequently for physical needs  - Identify cognitive and physical deficits and behaviors that affect risk of falls.  - [place] fall precautions as indicated by assessment.  - Educate pt/family on patient safety including physical limitations  - Instruct pt to call for assistance with activity based on assessment  - Modify environment to reduce risk of injury  - Provide assistive devices as appropriate  - Consider OT/PT consult to assist with strengthening/mobility  - Encourage toileting schedule  1/14/2025 0729 by Christa Kennedy RN  Outcome: Progressing  1/14/2025 0249 by Christa Kennedy RN  Outcome: Progressing   Patient alert and oriented x4, patient on Heparin drip for PE,  safety and bleeding precautions in place , call light within reach

## 2025-01-14 NOTE — ED QUICK NOTES
Orders for admission, patient is aware of plan and ready to go upstairs. Any questions, please call ED RN Oskar at extension [phone].     Patient Covid vaccination status: Fully vaccinated     COVID Test Ordered in ED: SARS-CoV-2/Flu A and B/RSV by PCR (GeneXpert)    COVID Suspicion at Admission: N/A    Running Infusions:    [START ON 1/14/2025] continuous dose heparin      None    Mental Status/LOC at time of transport: a&ox4    Other pertinent information:   CIWA score: N/A   NIH score:  N/A

## 2025-01-14 NOTE — PROGRESS NOTES
Progress Note     Phoenix Luna Patient Status:  Inpatient    1955 MRN [id number]   Location [facility] 5SW/SE Attending Monica Shoemaker MD   Hosp Day # 0 PCP Puma Wright     Chief Complaint: patient presented with   Chief Complaint   Patient presents with    Cough/URI       Subjective:   S: Patient denIES ANY COMPLAINTS, No Sob , no n/v. +ve hemoptysis     Review of Systems:   10 point ROS completed and was negative, except for pertinent positive and negatives stated in subjective.    Objective:   Vital signs:  Temp:  [97 °F (36.1 °C)-97.8 °F (36.6 °C)] 97.5 °F (36.4 °C)  Pulse:  [] 82  Resp:  [16-24] 18  BP: (114-126)/(71-83) 126/83  SpO2:  [93 %-95 %] 94 %    Wt Readings from Last 6 Encounters:   25 191 lb (86.6 kg)   17 175 lb (79.4 kg)   17 175 lb (79.4 kg)         Physical Exam:    General: No acute distress. Alert ,         Respiratory: Clear to auscultation bilaterally. No wheezes. No rhonchi.  Cardiovascular: S1, S2. Regular rate and rhythm. No murmurs, rubs or gallops.   Abdomen: Soft, nontender, nondistended.  Positive bowel sounds. No rebound or guarding.  Neurologic: No focal neurological deficits.   Musculoskeletal: Moves all extremities.  Extremities: No edema.    Results:   Diagnostic Data:      Labs:    Labs Last 24 Hours:   BMP     CBC    Other     Na 141 Cl 104 BUN 24 Glu 145   Hb 14.3   PTT 57.1 Procal -   K 3.8 CO2 25.0 Cr 1.07   WBC 12.1 >< .0  INR - CRP -   Renal Lytes Endo    Hct 41.6   Trop - D dim 2.95   eGFR - Ca 9.5 POC Gluc  -    LFT   pBNP - Lactic -   eGFR AA - PO4 - A1c -   AST - APk - Prot -  LDL -     Mg - TSH 1.860   ALT - T liliana - Alb -        COVID-19 Lab Results    COVID-19  Lab Results   Component Value Date    COVID19 Not Detected 2025       Pro-Calcitonin  No results for input(s): \"PCT\" in the last 168 hours.    Cardiac  No results for input(s): \"TROP\", \"PBNP\" in the last 168 hours.    Creatinine Kinase  No  results for input(s): \"CK\" in the last 168 hours.    Inflammatory Markers  Recent Labs   Lab 01/13/25 2127   DDIMER 2.95*       Imaging: Imaging data reviewed in Epic.    CT Chest PE protocol   CONCLUSION:   1. Acute right lower lobar, segmental, and subsegmental pulmonary emboli.      2. Patchy airspace opacities in the right lower lobe may reflect developing pulmonary infarction.       3. Small right larger than left pleural effusions and associated basilar atelectasis, with or without superimposed pneumonia.      4. Dilatation of the main pulmonary artery trunk may relate to underlying pulmonary hypertension.         5. Possible mild pulmonary interstitial edema.      6. Hepatic steatosis.       7. Lesser incidental findings as above.       US Venous doppler  CONCLUSION: No sonographic evidence of bilateral lower extremity DVT.       Medications:    levothyroxine  112 mcg Oral Before breakfast    potassium chloride  40 mEq Oral Once       Assessment & Plan:   ASSESSMENT / PLAN:     Problem List Items Addressed This Visit          HCC Problems    Pulmonary embolism and infarction (HCC) - Primary    Relevant Medications    heparin (Porcine) 1000 UNIT/ML injection - BOLUS IV 7,300 Units (Completed)    heparin (Porcine) 86883 units/250 mL infusion ED (PE/DVT/THROMBUS) INITIAL DOSE (Completed)    heparin (Porcine) 65212 units/250mL infusion PE/DVT/THROMBUS CONTINUOUS         Phoenix Luna is a 69 year old male with PMH of hypothyroidism, basal cell carcinoma, arthritis who presented with hemoptysis X 3 days.     #Pulmonary Embolism  -Patient with cough, SOB, and elevated ddimer. CT PE confirming segmental PE with pulm infarct  - remains hemodynamically stable, EKG with RBBB, troponin not elevated  - started on heparin gtt in ED   - pulm crit consulted  - ECHO pending   - LE venous ultrasound -> negative for DVT   - Due to hemoptysis felt unsafe discharging on DOAC, admit to tele         Chronic Problems    Hypothyroidism  Basal cell carcinoma   Osteoarthritis      Quality:  DVT Prophylaxis: heparin drip   CODE status: FULL   DISPO: pending clinical improvement.   Estimated date of discharge: To be determined  Discharge is dependent on: Improved clinical status  At this point Patient is expected to be discharge to: Home versus rehab      Plan of care discussed with Patient and RN.     Coordinated care with providers and counseling re: treatment plan and workup     Monica Shoemaker MD    Supplementary Documentation:         **Certification      PHYSICIAN Certification of Need for Inpatient Hospitalization - Initial Certification    Patient will require inpatient services that will reasonably be expected to span two midnight's based on the clinical documentation in H+P.   Based on patients current state of illness, I anticipate that, after discharge, patient will require TBD.             I personally reviewed the available laboratories, imaging including operative report. I discussed/will discuss the case with patient and her nurse. I ordered laboratories studies. I adjusted medications including not applicable today. Medical decision making high, risk is high.     >55min spent, >50% spent counseling and coordinating care in the form of educating pt/family and d/w consultants and staff. Most of the time spent discussing the above plan.

## 2025-01-15 LAB
ANION GAP SERPL CALC-SCNC: 10 MMOL/L (ref 0–18)
APTT PPP: 120.9 SECONDS (ref 23–36)
APTT PPP: 127.4 SECONDS (ref 23–36)
APTT PPP: 38.7 SECONDS (ref 23–36)
BASOPHILS # BLD AUTO: 0.11 X10(3) UL (ref 0–0.2)
BASOPHILS NFR BLD AUTO: 0.7 %
BUN BLD-MCNC: 24 MG/DL (ref 9–23)
BUN/CREAT SERPL: 27 (ref 10–20)
CALCIUM BLD-MCNC: 8.8 MG/DL (ref 8.7–10.4)
CHLORIDE SERPL-SCNC: 110 MMOL/L (ref 98–112)
CO2 SERPL-SCNC: 25 MMOL/L (ref 21–32)
CREAT BLD-MCNC: 0.89 MG/DL
DEPRECATED RDW RBC AUTO: 42.3 FL (ref 35.1–46.3)
EGFRCR SERPLBLD CKD-EPI 2021: 93 ML/MIN/1.73M2 (ref 60–?)
EOSINOPHIL # BLD AUTO: 0.21 X10(3) UL (ref 0–0.7)
EOSINOPHIL NFR BLD AUTO: 1.2 %
ERYTHROCYTE [DISTWIDTH] IN BLOOD BY AUTOMATED COUNT: 13.2 % (ref 11–15)
GLUCOSE BLD-MCNC: 96 MG/DL (ref 70–99)
HCT VFR BLD AUTO: 40.3 %
HGB BLD-MCNC: 13.9 G/DL
IMM GRANULOCYTES # BLD AUTO: 0.27 X10(3) UL (ref 0–1)
IMM GRANULOCYTES NFR BLD: 1.6 %
LYMPHOCYTES # BLD AUTO: 3.86 X10(3) UL (ref 1–4)
LYMPHOCYTES NFR BLD AUTO: 22.8 %
MCH RBC QN AUTO: 30 PG (ref 26–34)
MCHC RBC AUTO-ENTMCNC: 34.5 G/DL (ref 31–37)
MCV RBC AUTO: 86.9 FL
MONOCYTES # BLD AUTO: 1.29 X10(3) UL (ref 0.1–1)
MONOCYTES NFR BLD AUTO: 7.6 %
NEUTROPHILS # BLD AUTO: 11.18 X10 (3) UL (ref 1.5–7.7)
NEUTROPHILS # BLD AUTO: 11.18 X10(3) UL (ref 1.5–7.7)
NEUTROPHILS NFR BLD AUTO: 66.1 %
OSMOLALITY SERPL CALC.SUM OF ELEC: 304 MOSM/KG (ref 275–295)
PLATELET # BLD AUTO: 322 10(3)UL (ref 150–450)
POTASSIUM SERPL-SCNC: 3.8 MMOL/L (ref 3.5–5.1)
POTASSIUM SERPL-SCNC: 3.8 MMOL/L (ref 3.5–5.1)
RBC # BLD AUTO: 4.64 X10(6)UL
SODIUM SERPL-SCNC: 145 MMOL/L (ref 136–145)
WBC # BLD AUTO: 16.9 X10(3) UL (ref 4–11)

## 2025-01-15 PROCEDURE — 99232 SBSQ HOSP IP/OBS MODERATE 35: CPT | Performed by: INTERNAL MEDICINE

## 2025-01-15 PROCEDURE — 99232 SBSQ HOSP IP/OBS MODERATE 35: CPT | Performed by: FAMILY MEDICINE

## 2025-01-15 RX ORDER — POTASSIUM CHLORIDE 1500 MG/1
40 TABLET, EXTENDED RELEASE ORAL ONCE
Status: COMPLETED | OUTPATIENT
Start: 2025-01-15 | End: 2025-01-15

## 2025-01-15 NOTE — PAYOR COMM NOTE
--------------  ADMISSION REVIEW     Payor: [facility]  Subscriber #:  [id number]  Authorization Number: [id number]    Admit date: 1/14/25  Admit time:  1:06 AM       REVIEW DOCUMENTATION:    Patient Seen in: [facility] Emergency Department    History     Chief Complaint   Patient presents with    Cough/URI       HPI    69-year-old male who comes to the emergency department here with 3 days of mild dyspnea, cough with hemoptysis, reports mild chest pain that is right-sided that is pleuritic.  Denies any lower extremity edema.  Currently living at a hotel.    History reviewed.   Past Medical History:    Cancer (HCC)    throat    Esophageal reflux       History reviewed. History reviewed. No pertinent surgical history.      Medications :  Prescriptions Prior to Admission[1]     History reviewed. No pertinent family history.    Physical Exam     ED Triage Vitals [01/13/25 1953]   /80   Pulse 112   Resp 18   Temp 97 °F (36.1 °C)   Temp src Temporal   SpO2 94 %   O2 Device None (Room air)       Physical Exam    General: NAD  Head: Normocephalic and atraumatic.  Mouth/Throat/Ears/Nose: Oropharynx is clear and moist.   Eyes: Conjunctivae and EOM are normal.   Neck: Normal range of motion. Supple.   Cardiovascular: Normal rate, regular rhythm   Respiratory/Chest: wheezing, no tachypnea. Exhibits no tenderness.  Gastrointestinal: Soft, non-tender, non-distended. Bowel sounds are normal.   Musculoskeletal:No swelling or deformity.   Neurological: Alert and appropriate. No focal deficits.   Skin: Skin is warm and dry. No pallor.  Psychiatric: Has a normal mood and affect.      ED Course        Labs Reviewed   BASIC METABOLIC PANEL (8) - Abnormal; Notable for the following components:       Result Value    Glucose 101 (*)     BUN 24 (*)     BUN/CREA Ratio 21.4 (*)     All other components within normal limits   CBC WITH DIFFERENTIAL WITH PLATELET - Abnormal; Notable for the following components:    WBC  14.4 (*)     Neutrophil Absolute Prelim 9.96 (*)     Neutrophil Absolute 9.96 (*)     Monocyte Absolute 1.78 (*)     All other components within normal limits   D-DIMER - Abnormal; Notable for the following components:    D-Dimer 2.95 (*)     All other components within normal limits   TROPONIN I HIGH SENSITIVITY - Normal   SARS-COV-2/FLU A AND B/RSV BY PCR (GENEXPERT) - Normal     EKG     Rate, intervals and axes as noted on EKG Report.  Rate: 92  Rhythm: Sinus Rhythm  Reading: No STEMI.  Right bundle branch block.    ED Medications Administered:   Medications   heparin (Porcine) 1000 UNIT/ML injection - BOLUS IV 7,300 Units (has no administration in time range)   heparin (Porcine) 74469 units/250 mL infusion ED (PE/DVT/THROMBUS) INITIAL DOSE (has no administration in time range)   heparin (Porcine) 53413 units/250mL infusion PE/DVT/THROMBUS CONTINUOUS (has no administration in time range)   ipratropium-albuterol (Duoneb) 0.5-2.5 (3) MG/3ML inhalation solution 3 mL (3 mL Nebulization Given 1/13/25 2104)   predniSONE (Deltasone) tab 60 mg (60 mg Oral Given 1/13/25 2117)   iopamidol 76% (ISOVUE-370) injection for power injector (80 mL Intravenous Given 1/13/25 2219)         Preliminary Radiology Report  [facility], [facility]     CTA CHEST WITH CONTRAST (PE STUDY)     COMPARISON: None.     IMPRESSION:  Acute pulmonary emboli segmental and subsegmental right lower lobe.  No large central pulmonary embolus.  Motion/artifact intermittently degrade segmental and subsegmental evaluation.     No definitive evidence of heart strain. Enlarged heart.  No pericardial effusion.    Lateral right lower lobe pulmonary infarct with small right pleural effusion and basilar atelectasis.      Left basilar atelectasis.    No pneumothorax.    No acute aortic process given limitations of motion.    Enlarged steatotic liver.    Case discussed with Dr. Cat at 11:41 PM ET.    Brijesh Bullock MD/PhD      Disposition and Plan      Clinical Impression:  1. Other acute pulmonary embolism without acute cor pulmonale (HCC)        Disposition:  Admit    Signed by Pedrito Cat MD on 1/13/2025 11:06 PM           HISTORY AND PHYSICAL      Phoenix Luna is a 69 year old male with PMH of hypothyroidism, basal cell carcinoma, arthritis who presented with hemoptysis X 3 days.  Stated he was coughing up small amounts of blood along with right-sided chest pain, but thought it was muscle strain due to weight lifting, and thought maybe he got sick from the gym.  At baseline patient states he is sedentary spending most of his time on the bed or couch due to severe osteoarthritic hip pain.  States he compensates for his immobility by going to the gym daily and lifting weights upper body.  Denies smoking, recent travel.  Was supposed to see dermatology for basal cell carcinoma on his forehead.  Uses a walker.  Is currently living in a hotel because he is in between houses, and not because he is homeless he states.                In ED, tachycardic .  Patient had wheezing on exam was given DuoNebs prednisone with initial concern for bronchitis.  However D-dimer elevated and CT PE revealed segmental PE with pulmonary infarct.  Heparin drip started.  Due to hemoptysis felt uncomfortable sending him home on DOAC, so admitted to medical team with pulmonary consult.     Assessment & Plan:  #Pulmonary Embolism  -Patient with cough, SOB, and elevated ddimer. CT PE confirming segmental PE with pulm infarct  - remains hemodynamically stable, EKG with RBBB, troponin not elevated  - started on heparin gtt in ED   - pulm crit consulted  - ECHO, LE venous ultrasound ordered  - Due to hemoptysis felt unsafe discharging on DOAC, admit to tele         Chronic Problems   Hypothyroidism  Basal cell carcinoma   Osteoarthritis        DVT Prophylaxis: heparin   Diet: regular   CODE status: FULL          PULMONOLOGY CONSULT  1-14-25    Chief Complaint:   Phoenix MAYO  Cheryl is a(n) 69 year old male with pulmonary embolism.     HPI:   The patient is a family history of venous thromboembolism in his mother.  He had a hip replacement surgery last April.  He developed a flu syndrome over the last week.  He tells me that he was trying to exercise more vigorously with heated exercise room to try to defeat the virus.  However, he became more weak over the past 3 days and was essentially bedbound.  On 3 occasions he coughed up chunks of red blood and he showed me pictures of what he had brought up.  He has developed right-sided chest pain which is pleuritic in character.  He denies lower extremity symptomatology and there is no cramping or swelling.  He came to the emergency room for these concerns and was found to have pulmonary embolism.  He has a history of skin cancer which was not melanoma as well as a prior history of thyroid cancer.      Assessment/Plan:   1.  Pulmonary embolism-family history of PE and the patient had been bedbound with the flu.  Had associated hemoptysis and pleurisy.  The patient showed me pictures of what he had packed up with the hemoptysis.  I would be hesitant to give tPA and the patient with hemoptysis.     Recommendations:  1.  Heparin today  2.  If no significant further hemoptysis, would transition to Xarelto tomorrow.  3.  Await echo as ordered although again I would be hesitant to give tPA.  4.  Will follow clinically.     2.  History of thyroid cancer    BILATERAL VENOUS DOPPLER LE: CONCLUSION: No sonographic evidence of bilateral lower extremity DVT.              REVIEW DOCUMENTATION  1-15-25     Progress Note        Assessment and Plan:   1.  Pulmonary embolism-family history of PE and the patient had been bedbound with the flu.  Had associated hemoptysis and pleurisy.  The patient showed me pictures of what he had hacked up with the hemoptysis.  I would be hesitant to give tPA in the patient with hemoptysis.  Echo without signs of right  ventricular strain.     Breathing better and no further hemoptysis.  Okay to transition to Xarelto today.     Recommendations:  1.  Stop heparin today  2.  Begin Xarelto  3.  Will follow clinically.     2.  History of thyroid cancer           Subjective:   Phoenix Luna is a(n) 69 year old male who is breathing a bit better.     Objective:   Blood pressure 121/84, pulse 70, temperature 97.5 °F (36.4 °C), temperature source Oral, resp. rate 18, height 5' 11\" (1.803 m), weight 191 lb (86.6 kg), SpO2 95%.     Physical Exam alert white male  HEENT examination is unremarkable with pupils equal round and reactive to light and accommodation.   Neck without adenopathy, thyromegaly, JVD nor bruit.   Lungs slightly diminished to auscultation and percussion.  Cardiac regular rate and rhythm no murmur.   Abdomen nontender, without hepatosplenomegaly and no mass appreciable.   Extremities without clubbing cyanosis nor edema.   Neurologic grossly intact with symmetric tone and strength and reflex.  Skin without gross abnormality     Results:            Lab Results   Component Value Date     WBC 16.9 01/15/2025     HGB 13.9 01/15/2025     HCT 40.3 01/15/2025     .0 01/15/2025     CREATSERUM 0.89 01/15/2025     BUN 24 01/15/2025      01/15/2025     K 3.8 01/15/2025     K 3.8 01/15/2025      01/15/2025     CO2 25.0 01/15/2025     GLU 96 01/15/2025     CA 8.8 01/15/2025     .4 01/15/2025           MEDICATIONS ADMINISTERED IN LAST 1 DAY:  heparin (Porcine) 52599 units/250mL infusion PE/DVT/THROMBUS CONTINUOUS       Date Action Dose Route User    1/15/2025 0946 Hi-Risk Restarted 1,350 Units/hr Intravenous Ileana Lane RN    1/15/2025 0812 New Bag 1,600 Units/hr Intravenous Ileana Lane RN    1/15/2025 0707 Hi-Risk Rate/Dose Change 1,600 Units/hr Intravenous Christa Kennedy RN    1/15/2025 0114 Hi-Risk Rate/Dose Change 1,600 Units/hr Intravenous Christa Kennedy, RN    1/14/2025 2421  Hi-Risk Rate/Dose Change 1,750 Units/hr Intravenous Ileana Lane RN    1/14/2025 1825 New Bag 1,600 Units/hr Intravenous Ileana Lane RN          levothyroxine (Synthroid) tab 112 mcg       Date Action Dose Route User    1/15/2025 0551 Given 112 mcg Oral Christa Kennedy RN          potassium chloride (Klor-Con M20) tab 40 mEq       Date Action Dose Route User    1/15/2025 0955 Given 40 mEq Oral Ileana Lane RN          rivaroxaban (Xarelto) tab 15 mg       Date Action Dose Route User    1/15/2025 1044 Given 15 mg Oral Ileana Lane RN            Vitals (last day)       Date/Time Temp Pulse Resp BP SpO2 Weight O2 Device O2 Flow Rate (L/min) [facility]    01/15/25 0802 97.5 °F (36.4 °C) 70 18 121/84 95 % -- None (Room air) --     01/15/25 0549 97.6 °F (36.4 °C) 68 16 115/75 96 % -- None (Room air) --     01/15/25 0300 -- 66 -- -- -- -- -- --     01/14/25 2203 97.3 °F (36.3 °C) 76 16 -- 94 % -- None (Room air) --     01/14/25 1900 -- 73 -- -- -- -- -- --     01/14/25 1414 98 °F (36.7 °C) 78 18 124/84 94 % -- None (Room air) --     01/14/25 1103 97.5 °F (36.4 °C) 82 18 126/83 94 % -- None (Room air) --     01/14/25 0621 97.8 °F (36.6 °C) 83 16 114/77 94 % -- None (Room air) --     01/14/25 0146 -- 77 -- -- -- -- -- --     01/14/25 0128 -- -- -- -- -- 191 lb (86.6 kg) -- --     01/14/25 0110 97.8 °F (36.6 °C) 70 18 116/71 93 % -- None (Room air) -- CG    01/14/25 0000 -- 74 17 120/71 95 % -- None (Room air) --               [1] (Not in a hospital admission)

## 2025-01-15 NOTE — PROGRESS NOTES
[facility]  part of [facility]    Progress Note      Assessment and Plan:   1.  Pulmonary embolism-family history of PE and the patient had been bedbound with the flu.  Had associated hemoptysis and pleurisy.  The patient showed me pictures of what he had hacked up with the hemoptysis.  I would be hesitant to give tPA in the patient with hemoptysis.  Echo without signs of right ventricular strain.    Breathing better and no further hemoptysis.  Okay to transition to Xarelto today.     Recommendations:  1.  Stop heparin today  2.  Begin Xarelto  3.  Will follow clinically.     2.  History of thyroid cancer        Subjective:   Phoenix Lnua is a(n) 69 year old male who is breathing a bit better.    Objective:   Blood pressure 121/84, pulse 70, temperature 97.5 °F (36.4 °C), temperature source Oral, resp. rate 18, height 5' 11\" (1.803 m), weight 191 lb (86.6 kg), SpO2 95%.    Physical Exam alert white male  HEENT examination is unremarkable with pupils equal round and reactive to light and accommodation.   Neck without adenopathy, thyromegaly, JVD nor bruit.   Lungs slightly diminished to auscultation and percussion.  Cardiac regular rate and rhythm no murmur.   Abdomen nontender, without hepatosplenomegaly and no mass appreciable.   Extremities without clubbing cyanosis nor edema.   Neurologic grossly intact with symmetric tone and strength and reflex.  Skin without gross abnormality     Results:     Lab Results   Component Value Date    WBC 16.9 01/15/2025    HGB 13.9 01/15/2025    HCT 40.3 01/15/2025    .0 01/15/2025    CREATSERUM 0.89 01/15/2025    BUN 24 01/15/2025     01/15/2025    K 3.8 01/15/2025    K 3.8 01/15/2025     01/15/2025    CO2 25.0 01/15/2025    GLU 96 01/15/2025    CA 8.8 01/15/2025    .4 01/15/2025       Rajiv Keen MD  Medical Director, Critical Care, [facility]  Medical Director, [facility]  Pager: [phone]

## 2025-01-15 NOTE — PLAN OF CARE
Patient APTT was 127.4. Heparin drip was stopped at 8:46 am per protocol and will be restarted in 60 minutes.

## 2025-01-15 NOTE — CM/SW NOTE
Patient discussed in RN DC rounds. SW received MDO for discharge planning. SW followed up with Xarelto RX. SW called [facility]'s pharmacy, who informed that it was not sent to them. Upon clarification, patient is obtaining RX through [facility] pharmacy.     SAMY/JULIAN to remain available for support and/or discharge planning.     Estrellita Webber, MSW, LSW   x [phone]

## 2025-01-15 NOTE — PLAN OF CARE
Problem: Patient Centered Care  Goal: Patient preferences are identified and integrated in the patient's plan of care  Description: Interventions:  - What would you like us to know as we care for you?   - Provide timely, complete, and accurate information to patient/family  - Incorporate patient and family knowledge, values, beliefs, and cultural backgrounds into the planning and delivery of care  - Encourage patient/family to participate in care and decision-making at the level they choose  - Honor patient and family perspectives and choices  Outcome: Progressing       Problem: RESPIRATORY - ADULT  Goal: Achieves optimal ventilation and oxygenation  Description: INTERVENTIONS:  - Assess for changes in respiratory status  - Assess for changes in mentation and behavior  - Position to facilitate oxygenation and minimize respiratory effort  - Oxygen supplementation based on oxygen saturation or ABGs  - Provide Smoking Cessation handout, if applicable  - Encourage broncho-pulmonary hygiene including cough, deep breathe, Incentive Spirometry  - Assess the need for suctioning and perform as needed  - Assess and instruct to report SOB or any respiratory difficulty  - Respiratory Therapy support as indicated  - Manage/alleviate anxiety  - Monitor for signs/symptoms of CO2 retention  Outcome: Progressing     Problem: HEMATOLOGIC - ADULT  Goal: Maintains hematologic stability  Description: INTERVENTIONS  - Assess for signs and symptoms of bleeding or hemorrhage  - Monitor labs and vital signs for trends  - Administer supportive blood products/factors, fluids and medications as ordered and appropriate  - Administer supportive blood products/factors as ordered and appropriate  Outcome: Progressing  Goal: Free from bleeding injury  Description: (Example usage: patient with low platelets)  INTERVENTIONS:  - Avoid intramuscular injections, enemas and rectal medication administration  - Ensure safe mobilization of patient  - Hold  pressure on venipuncture sites to achieve adequate hemostasis  - Assess for signs and symptoms of internal bleeding  - Monitor lab trends  - Patient is to report abnormal signs of bleeding to staff  - Avoid use of toothpicks and dental floss  - Use electric shaver for shaving  - Use soft bristle tooth brush  - Limit straining and forceful nose blowing  Outcome: Progressing     Problem: SAFETY ADULT - FALL  Goal: Free from fall injury  Description: INTERVENTIONS:  - Assess pt frequently for physical needs  - Identify cognitive and physical deficits and behaviors that affect risk of falls.  - [place] fall precautions as indicated by assessment.  - Educate pt/family on patient safety including physical limitations  - Instruct pt to call for assistance with activity based on assessment  - Modify environment to reduce risk of injury  - Provide assistive devices as appropriate  - Consider OT/PT consult to assist with strengthening/mobility  - Encourage toileting schedule  Outcome: Progressing

## 2025-01-16 VITALS
RESPIRATION RATE: 25 BRPM | BODY MASS INDEX: 26.74 KG/M2 | SYSTOLIC BLOOD PRESSURE: 117 MMHG | TEMPERATURE: 98 F | OXYGEN SATURATION: 96 % | WEIGHT: 191 LBS | HEART RATE: 96 BPM | HEIGHT: 71 IN | DIASTOLIC BLOOD PRESSURE: 84 MMHG

## 2025-01-16 LAB
ANION GAP SERPL CALC-SCNC: 10 MMOL/L (ref 0–18)
BASOPHILS # BLD AUTO: 0.13 X10(3) UL (ref 0–0.2)
BASOPHILS NFR BLD AUTO: 1.1 %
BUN BLD-MCNC: 19 MG/DL (ref 9–23)
BUN/CREAT SERPL: 18.3 (ref 10–20)
CALCIUM BLD-MCNC: 9 MG/DL (ref 8.7–10.4)
CHLORIDE SERPL-SCNC: 109 MMOL/L (ref 98–112)
CO2 SERPL-SCNC: 25 MMOL/L (ref 21–32)
CREAT BLD-MCNC: 1.04 MG/DL
DEPRECATED RDW RBC AUTO: 43.3 FL (ref 35.1–46.3)
EGFRCR SERPLBLD CKD-EPI 2021: 78 ML/MIN/1.73M2 (ref 60–?)
EOSINOPHIL # BLD AUTO: 0.27 X10(3) UL (ref 0–0.7)
EOSINOPHIL NFR BLD AUTO: 2.4 %
ERYTHROCYTE [DISTWIDTH] IN BLOOD BY AUTOMATED COUNT: 13.3 % (ref 11–15)
GLUCOSE BLD-MCNC: 96 MG/DL (ref 70–99)
HCT VFR BLD AUTO: 39.8 %
HGB BLD-MCNC: 13.9 G/DL
IMM GRANULOCYTES # BLD AUTO: 0.26 X10(3) UL (ref 0–1)
IMM GRANULOCYTES NFR BLD: 2.3 %
LYMPHOCYTES # BLD AUTO: 2.42 X10(3) UL (ref 1–4)
LYMPHOCYTES NFR BLD AUTO: 21.2 %
MCH RBC QN AUTO: 30.8 PG (ref 26–34)
MCHC RBC AUTO-ENTMCNC: 34.9 G/DL (ref 31–37)
MCV RBC AUTO: 88.2 FL
MONOCYTES # BLD AUTO: 1.14 X10(3) UL (ref 0.1–1)
MONOCYTES NFR BLD AUTO: 10 %
NEUTROPHILS # BLD AUTO: 7.19 X10 (3) UL (ref 1.5–7.7)
NEUTROPHILS # BLD AUTO: 7.19 X10(3) UL (ref 1.5–7.7)
NEUTROPHILS NFR BLD AUTO: 63 %
OSMOLALITY SERPL CALC.SUM OF ELEC: 300 MOSM/KG (ref 275–295)
PLATELET # BLD AUTO: 309 10(3)UL (ref 150–450)
POTASSIUM SERPL-SCNC: 4.3 MMOL/L (ref 3.5–5.1)
POTASSIUM SERPL-SCNC: 4.3 MMOL/L (ref 3.5–5.1)
RBC # BLD AUTO: 4.51 X10(6)UL
SODIUM SERPL-SCNC: 144 MMOL/L (ref 136–145)
WBC # BLD AUTO: 11.4 X10(3) UL (ref 4–11)

## 2025-01-16 PROCEDURE — 99239 HOSP IP/OBS DSCHRG MGMT >30: CPT | Performed by: FAMILY MEDICINE

## 2025-01-16 PROCEDURE — 99232 SBSQ HOSP IP/OBS MODERATE 35: CPT | Performed by: INTERNAL MEDICINE

## 2025-01-16 NOTE — CM/SW NOTE
01/16/25 1200   CM/SW Referral Data   Referral Source Physician   Reason for Referral Discharge planning   Informant Patient   Medical Hx   Does patient have an established PCP? Yes   Patient Info   Patient's Current Mental Status at Time of Assessment Oriented;Alert   Patient's Home Environment Other  ([facility])   Patient lives with Alone   Patient Status Prior to Admission   Independent with ADLs and Mobility Yes   Services in place prior to admission DME/Supplies at home   Type of DME/Supplies Wheeled Walker   Discharge Needs   Anticipated D/C needs Home health care     SW received MDO for discharge planning. Sw met with patient who is alert and oriented at time of assessment. Sw introduced self and role to patient. Patient provided above information. Patient lives at [address] [address] alone. Patient reports that he is independent. Patient reports that he has a walker, no hx hh but hx of [facility]. Patient was informed that his MD is ordering HH. Patient is agreeable to HH services. SW sent HH referrals via aidin. SW placed f2f. Patient will be provided list when avail.     202pm- SW provided patient HH list. SW informed patient that patient will need to provide choice.     231pm- SW followed up with patient in regards to choice. Patient wishes for [facility]. Patient reserved with [facility] via aidin.     SW/CM to remain available for support and/or discharge planning.     Estrellita Webber, MSW, LSW   x [phone]

## 2025-01-16 NOTE — PLAN OF CARE
Problem: Patient Centered Care  Goal: Patient preferences are identified and integrated in the patient's plan of care  Description: Interventions:  - What would you like us to know as we care for you?   - Provide timely, complete, and accurate information to patient/family  - Incorporate patient and family knowledge, values, beliefs, and cultural backgrounds into the planning and delivery of care  - Encourage patient/family to participate in care and decision-making at the level they choose  - Honor patient and family perspectives and choices  Outcome: Progressing     Problem: Patient/Family Goals  Goal: Patient/Family Long Term Goal  Description: Patient's Long Term Goal: Pulmonary embolism resolved.    Interventions:  - Patient started on xarelto twice per order.  - Pulmonary on consult.  - Vital signs and labs are monitored.   - See additional Care Plan goals for specific interventions  Outcome: Progressing  Goal: Patient/Family Short Term Goal  Description: Patient's Short Term Goal: Safe & free from falls.    Interventions:   - Fall risk precautions are followed at all times.  - Call light within reach at all times.  - Bed and chair alarm in use at all times.   - See additional Care Plan goals for specific interventions  Outcome: Progressing     Problem: RESPIRATORY - ADULT  Goal: Achieves optimal ventilation and oxygenation  Description: INTERVENTIONS:  - Assess for changes in respiratory status  - Assess for changes in mentation and behavior  - Position to facilitate oxygenation and minimize respiratory effort  - Oxygen supplementation based on oxygen saturation or ABGs  - Provide Smoking Cessation handout, if applicable  - Encourage broncho-pulmonary hygiene including cough, deep breathe, Incentive Spirometry  - Assess the need for suctioning and perform as needed  - Assess and instruct to report SOB or any respiratory difficulty  - Respiratory Therapy support as indicated  - Manage/alleviate anxiety  -  Monitor for signs/symptoms of CO2 retention  Outcome: Progressing     Problem: HEMATOLOGIC - ADULT  Goal: Maintains hematologic stability  Description: INTERVENTIONS  - Assess for signs and symptoms of bleeding or hemorrhage  - Monitor labs and vital signs for trends  - Administer supportive blood products/factors, fluids and medications as ordered and appropriate  - Administer supportive blood products/factors as ordered and appropriate  Outcome: Progressing  Goal: Free from bleeding injury  Description: (Example usage: patient with low platelets)  INTERVENTIONS:  - Avoid intramuscular injections, enemas and rectal medication administration  - Ensure safe mobilization of patient  - Hold pressure on venipuncture sites to achieve adequate hemostasis  - Assess for signs and symptoms of internal bleeding  - Monitor lab trends  - Patient is to report abnormal signs of bleeding to staff  - Avoid use of toothpicks and dental floss  - Use electric shaver for shaving  - Use soft bristle tooth brush  - Limit straining and forceful nose blowing  Outcome: Progressing     Problem: SAFETY ADULT - FALL  Goal: Free from fall injury  Description: INTERVENTIONS:  - Assess pt frequently for physical needs  - Identify cognitive and physical deficits and behaviors that affect risk of falls.  - [place] fall precautions as indicated by assessment.  - Educate pt/family on patient safety including physical limitations  - Instruct pt to call for assistance with activity based on assessment  - Modify environment to reduce risk of injury  - Provide assistive devices as appropriate  - Consider OT/PT consult to assist with strengthening/mobility  - Encourage toileting schedule  Outcome: Progressing     Patient is presently resting in the bed. Alert x 4. Vital signs taken and stable. Fall risk precautions are followed at all times. Patient ambulates using a walker and with standby assist for safety. No complaints of pain or discomfort.  Tolerates diet well. Patient heparin drip was discontinued this morning and patient was started on xarelto twice a day per pulmonary doctor. Patient ambulated in hallway with this RN and tolerated it well using a walker for safety. PT eval and treat ordered for patient by doctor. Patient has  consult for discharge planning. Call light within reach at all times.

## 2025-01-16 NOTE — DISCHARGE SUMMARY
[facility]  part of [facility]    Discharge Summary    Phoenix Luna Patient Status:  Inpatient    1955 MRN [id number]   Location [facility] 5SW/SE Attending Monica Shoemaker MD   Hosp Day # 2 PCP Puma Wright     Date of Admission: 2025 Disposition: Home or Self Care     Date of Discharge: 2025     Admitting Diagnosis: Other acute pulmonary embolism without acute cor pulmonale (HCC) [I26.99]    Hospital Discharge Diagnoses:   Pulmonary Embolism   Hypothyroidism  Basal cell carcinoma   Osteoarthritis       Lace+ Score: 35  59-90 High Risk  29-58 Medium Risk  0-28   Low Risk.    TCM Follow-Up Recommendation:  LACE 29-58: Moderate Risk of readmission after discharge from the hospital.      Problem List:   Patient Active Problem List   Diagnosis    Pulmonary embolism and infarction (HCC)    Other acute pulmonary embolism without acute cor pulmonale (HCC)    Pulmonary embolism (HCC)       Reason for Admission:     Physical Exam:   General appearance: alert, appears stated age and cooperative  Pulmonary:  clear to auscultation bilaterally  Cardiovascular: S1, S2 normal, no murmur, click, rub or gallop, regular rate and rhythm  Abdominal: soft, non-tender; bowel sounds normal; no masses,  no organomegaly  Extremities: extremities normal, atraumatic, no cyanosis or edema  Psychiatric: calm      History of Present Illness:     Hospital Course:   Phoenix Luna is a 69 year old male with PMH of hypothyroidism, basal cell carcinoma, arthritis who presented with hemoptysis X 3 days.      #Pulmonary Embolism  -Patient with cough, SOB, and elevated ddimer. CT PE confirming segmental PE with pulm infarct  - remains hemodynamically stable, EKG with RBBB, troponin not elevated  - started on heparin gtt in ED   - pulm crit consulted  - ECHO pending   - LE venous ultrasound -> negative for DVT   - Due to hemoptysis felt unsafe discharging on DOAC, admit to tele   1/15: d/w   Osmani, hemoptysis is improved, heparin dc, xarelto started, follow clinically, possible dc tomorrow  1/16: pt did well during this hospitalisation and was dc on Xarelto and FU with Pulmonary. Pt had a stable hospital course       Consultations: Pulmonary     Procedures:     Ct chest PE   CONCLUSION:   1. Acute right lower lobar, segmental, and subsegmental pulmonary emboli.      2. Patchy airspace opacities in the right lower lobe may reflect developing pulmonary infarction.       3. Small right larger than left pleural effusions and associated basilar atelectasis, with or without superimposed pneumonia.      4. Dilatation of the main pulmonary artery trunk may relate to underlying pulmonary hypertension.         5. Possible mild pulmonary interstitial edema.      6. Hepatic steatosis.       7. Lesser incidental findings as above.     Ultrasound Venous doppler LE bilaterally   No sonographic evidence of bilateral lower extremity DVT.       Complications: see hospital course     Discharge Condition: Good    Discharge Medications:      Discharge Medications        START taking these medications        Instructions Prescription details   rivaroxaban 15 & 20 MG Tbpk      Take As Directed based on package instructions: Days 1-21: 15 mg by mouth twice daily Days 22-30: 20 mg by mouth once daily   Quantity: 51 each  Refills: 0            CONTINUE taking these medications        Instructions Prescription details   levothyroxine 112 MCG Tabs  Commonly known as: Synthroid      Take 1 tablet (112 mcg total) by mouth before breakfast.   Refills: 0               Where to Get Your Medications        Please  your prescriptions at the location directed by your doctor or nurse    Bring a paper prescription for each of these medications  rivaroxaban 15 & 20 MG Tbpk         Follow up Visits: Follow-up with  Dr Keen in 1 week        Monica Shoemaker MD  1/16/2025  11:32 AM    > 35 min

## 2025-01-16 NOTE — PLAN OF CARE
Problem: Patient Centered Care  Goal: Patient preferences are identified and integrated in the patient's plan of care  Description: Interventions:  - What would you like us to know as we care for you?   - Provide timely, complete, and accurate information to patient/family  - Incorporate patient and family knowledge, values, beliefs, and cultural backgrounds into the planning and delivery of care  - Encourage patient/family to participate in care and decision-making at the level they choose  - Honor patient and family perspectives and choices  Outcome: Progressing     Problem: Patient/Family Goals  Goal: Patient/Family Long Term Goal  Description: Patient's Long Term Goal: safety     Interventions:  - Monitor vitals  - Monitor appropriate labs  - Administer medications as ordered  - Follow MD's orders  - Update patient on plan of care   - Discharge planning     - See additional Care Plan goals for specific interventions  Outcome: Progressing  Goal: Patient/Family Short Term Goal  Description: Patient's Short Term Goal: PE treatment     Interventions:   -Hep gtt dc'd now on PO xarelto   - See additional Care Plan goals for specific interventions  Outcome: Progressing     Problem: RESPIRATORY - ADULT  Goal: Achieves optimal ventilation and oxygenation  Description: INTERVENTIONS:  - Assess for changes in respiratory status  - Assess for changes in mentation and behavior  - Position to facilitate oxygenation and minimize respiratory effort  - Oxygen supplementation based on oxygen saturation or ABGs  - Provide Smoking Cessation handout, if applicable  - Encourage broncho-pulmonary hygiene including cough, deep breathe, Incentive Spirometry  - Assess the need for suctioning and perform as needed  - Assess and instruct to report SOB or any respiratory difficulty  - Respiratory Therapy support as indicated  - Manage/alleviate anxiety  - Monitor for signs/symptoms of CO2 retention  Outcome: Progressing     Problem:  HEMATOLOGIC - ADULT  Goal: Maintains hematologic stability  Description: INTERVENTIONS  - Assess for signs and symptoms of bleeding or hemorrhage  - Monitor labs and vital signs for trends  - Administer supportive blood products/factors, fluids and medications as ordered and appropriate  - Administer supportive blood products/factors as ordered and appropriate  Outcome: Progressing  Goal: Free from bleeding injury  Description: (Example usage: patient with low platelets)  INTERVENTIONS:  - Avoid intramuscular injections, enemas and rectal medication administration  - Ensure safe mobilization of patient  - Hold pressure on venipuncture sites to achieve adequate hemostasis  - Assess for signs and symptoms of internal bleeding  - Monitor lab trends  - Patient is to report abnormal signs of bleeding to staff  - Avoid use of toothpicks and dental floss  - Use electric shaver for shaving  - Use soft bristle tooth brush  - Limit straining and forceful nose blowing  Outcome: Progressing     Problem: SAFETY ADULT - FALL  Goal: Free from fall injury  Description: INTERVENTIONS:  - Assess pt frequently for physical needs  - Identify cognitive and physical deficits and behaviors that affect risk of falls.  - [place] fall precautions as indicated by assessment.  - Educate pt/family on patient safety including physical limitations  - Instruct pt to call for assistance with activity based on assessment  - Modify environment to reduce risk of injury  - Provide assistive devices as appropriate  - Consider OT/PT consult to assist with strengthening/mobility  - Encourage toileting schedule  Outcome: Progressing

## 2025-01-16 NOTE — PROGRESS NOTES
Progress Note     Phoenix Luna Patient Status:  Inpatient    1955 MRN [id number]   Location [facility] 5SW/SE Attending Monica Shoemaker MD   Hosp Day # 1 PCP Puma Wright     Chief Complaint: patient presented with   Chief Complaint   Patient presents with    Cough/URI       Subjective:   S: Patient denIES ANY COMPLAINTS, No Sob , no n/v. +ve hemoptysis     Review of Systems:   10 point ROS completed and was negative, except for pertinent positive and negatives stated in subjective.    Objective:   Vital signs:  Temp:  [97.3 °F (36.3 °C)-97.6 °F (36.4 °C)] 97.6 °F (36.4 °C)  Pulse:  [66-80] 80  Resp:  [16-18] 18  BP: (115-121)/(75-84) 121/79  SpO2:  [94 %-96 %] 95 %    Wt Readings from Last 6 Encounters:   25 191 lb (86.6 kg)   17 175 lb (79.4 kg)   17 175 lb (79.4 kg)         Physical Exam:    General: No acute distress. Alert ,         Respiratory: Clear to auscultation bilaterally. No wheezes. No rhonchi.  Cardiovascular: S1, S2. Regular rate and rhythm. No murmurs, rubs or gallops.   Abdomen: Soft, nontender, nondistended.  Positive bowel sounds. No rebound or guarding.  Neurologic: No focal neurological deficits.   Musculoskeletal: Moves all extremities.  Extremities: No edema.    Results:   Diagnostic Data:      Labs:    Labs Last 24 Hours:   BMP     CBC    Other     Na 145 Cl 110 BUN 24 Glu 96   Hb 13.9   PTT 38.7 Procal -   K 3.8; 3.8 CO2 25.0 Cr 0.89   WBC 16.9 >< .0  INR - CRP -   Renal Lytes Endo    Hct 40.3   Trop - D dim -   eGFR - Ca 8.8 POC Gluc  -    LFT   pBNP - Lactic -   eGFR AA - PO4 - A1c -   AST - APk - Prot -  LDL -     Mg - TSH -   ALT - T liliana - Alb -        COVID-19 Lab Results    COVID-19  Lab Results   Component Value Date    COVID19 Not Detected 2025       Pro-Calcitonin  No results for input(s): \"PCT\" in the last 168 hours.    Cardiac  No results for input(s): \"TROP\", \"PBNP\" in the last 168 hours.    Creatinine Kinase  No results  for input(s): \"CK\" in the last 168 hours.    Inflammatory Markers  Recent Labs   Lab 01/13/25 2128   DDIMER 2.95*       Imaging: Imaging data reviewed in Epic.    CT Chest PE protocol   CONCLUSION:   1. Acute right lower lobar, segmental, and subsegmental pulmonary emboli.      2. Patchy airspace opacities in the right lower lobe may reflect developing pulmonary infarction.       3. Small right larger than left pleural effusions and associated basilar atelectasis, with or without superimposed pneumonia.      4. Dilatation of the main pulmonary artery trunk may relate to underlying pulmonary hypertension.         5. Possible mild pulmonary interstitial edema.      6. Hepatic steatosis.       7. Lesser incidental findings as above.       US Venous doppler  CONCLUSION: No sonographic evidence of bilateral lower extremity DVT.       Medications:    rivaroxaban  15 mg Oral BID with meals    levothyroxine  112 mcg Oral Before breakfast       Assessment & Plan:   ASSESSMENT / PLAN:     Problem List Items Addressed This Visit          HCC Problems    Pulmonary embolism and infarction (HCC) - Primary    Relevant Medications    heparin (Porcine) 1000 UNIT/ML injection - BOLUS IV 7,300 Units (Completed)    heparin (Porcine) 97323 units/250 mL infusion ED (PE/DVT/THROMBUS) INITIAL DOSE (Completed)    rivaroxaban (Xarelto) tab 15 mg    rivaroxaban 15 MG Oral Tab         Phoenix Luna is a 69 year old male with PMH of hypothyroidism, basal cell carcinoma, arthritis who presented with hemoptysis X 3 days.     #Pulmonary Embolism  -Patient with cough, SOB, and elevated ddimer. CT PE confirming segmental PE with pulm infarct  - remains hemodynamically stable, EKG with RBBB, troponin not elevated  - started on heparin gtt in ED   - pulm crit consulted  - ECHO pending   - LE venous ultrasound -> negative for DVT   - Due to hemoptysis felt unsafe discharging on DOAC, admit to tele   1/15: d/w Dr Keen, hemoptysis is improved,  heparin dc, xarelto started, follow clinically, possible dc tommorrow        Chronic Problems   Hypothyroidism  Basal cell carcinoma   Osteoarthritis      Quality:  DVT Prophylaxis: heparin drip   CODE status: FULL   DISPO: pending clinical improvement.   Estimated date of discharge: To be determined  Discharge is dependent on: Improved clinical status  At this point Patient is expected to be discharge to: Home versus rehab      Plan of care discussed with Patient and RN.     Coordinated care with providers and counseling re: treatment plan and workup     Monica Shoemaker MD    Supplementary Documentation:         **Certification      PHYSICIAN Certification of Need for Inpatient Hospitalization - Initial Certification    Patient will require inpatient services that will reasonably be expected to span two midnight's based on the clinical documentation in H+P.   Based on patients current state of illness, I anticipate that, after discharge, patient will require TBD.             I personally reviewed the available laboratories, imaging including operative report. I discussed/will discuss the case with patient and her nurse. I ordered laboratories studies. I adjusted medications including not applicable today. Medical decision making high, risk is high.     >55min spent, >50% spent counseling and coordinating care in the form of educating pt/family and d/w consultants and staff. Most of the time spent discussing the above plan.

## 2025-01-16 NOTE — PLAN OF CARE
Problem: Patient Centered Care  Goal: Patient preferences are identified and integrated in the patient's plan of care  Description: Interventions:  - What would you like us to know as we care for you? Home alone.  - Provide timely, complete, and accurate information to patient/family  - Incorporate patient and family knowledge, values, beliefs, and cultural backgrounds into the planning and delivery of care  - Encourage patient/family to participate in care and decision-making at the level they choose  - Honor patient and family perspectives and choices  Outcome: Adequate for Discharge     Problem: Patient/Family Goals  Goal: Patient/Family Long Term Goal  Description: Patient's Long Term Goal: to feel better    Interventions:  - follow doctor's orders.  - See additional Care Plan goals for specific interventions  Outcome: Adequate for Discharge  Goal: Patient/Family Short Term Goal  Description: Patient's Short Term Goal: to go home soon.    Interventions:   - plan for discharge today with [facility] RN.  - See additional Care Plan goals for specific interventions  Outcome: Adequate for Discharge     Problem: RESPIRATORY - ADULT  Goal: Achieves optimal ventilation and oxygenation  Description: INTERVENTIONS:  - Assess for changes in respiratory status  - Assess for changes in mentation and behavior  - Position to facilitate oxygenation and minimize respiratory effort  - Oxygen supplementation based on oxygen saturation or ABGs  - Provide Smoking Cessation handout, if applicable  - Encourage broncho-pulmonary hygiene including cough, deep breathe, Incentive Spirometry  - Assess the need for suctioning and perform as needed  - Assess and instruct to report SOB or any respiratory difficulty  - Respiratory Therapy support as indicated  - Manage/alleviate anxiety  - Monitor for signs/symptoms of CO2 retention  Outcome: Adequate for Discharge     Problem: HEMATOLOGIC - ADULT  Goal: Maintains hematologic  stability  Description: INTERVENTIONS  - Assess for signs and symptoms of bleeding or hemorrhage  - Monitor labs and vital signs for trends  - Administer supportive blood products/factors, fluids and medications as ordered and appropriate  - Administer supportive blood products/factors as ordered and appropriate  Outcome: Adequate for Discharge  Goal: Free from bleeding injury  Description: (Example usage: patient with low platelets)  INTERVENTIONS:  - Avoid intramuscular injections, enemas and rectal medication administration  - Ensure safe mobilization of patient  - Hold pressure on venipuncture sites to achieve adequate hemostasis  - Assess for signs and symptoms of internal bleeding  - Monitor lab trends  - Patient is to report abnormal signs of bleeding to staff  - Avoid use of toothpicks and dental floss  - Use electric shaver for shaving  - Use soft bristle tooth brush  - Limit straining and forceful nose blowing  Outcome: Adequate for Discharge     Problem: SAFETY ADULT - FALL  Goal: Free from fall injury  Description: INTERVENTIONS:  - Assess pt frequently for physical needs  - Identify cognitive and physical deficits and behaviors that affect risk of falls.  - [place] fall precautions as indicated by assessment.  - Educate pt/family on patient safety including physical limitations  - Instruct pt to call for assistance with activity based on assessment  - Modify environment to reduce risk of injury  - Provide assistive devices as appropriate  - Consider OT/PT consult to assist with strengthening/mobility  - Encourage toileting schedule  Outcome: Adequate for Discharge  Patient is medically stable and is being discharge home today.

## 2025-01-16 NOTE — PROGRESS NOTES
[facility]  part of [facility]    Progress Note      Assessment and Plan:   1.  Pulmonary embolism-family history of PE and the patient had been bedbound with the flu.  Had associated hemoptysis and pleurisy.  The patient yet has scant hemoptysis.  However, he is okay to go home from my perspective.    Recommendations:  1.  Okay to home with Xarelto  2.  See me in the office at the 3-week interval or sooner if needed.     2.  History of thyroid cancer        Subjective:   Phoenix Luna is a(n) 69 year old male who is breathing a bit better.    Objective:   Blood pressure 117/84, pulse 96, temperature 97.6 °F (36.4 °C), temperature source Oral, resp. rate 25, height 5' 11\" (1.803 m), weight 191 lb (86.6 kg), SpO2 96%.    Physical Exam alert white male  HEENT examination is unremarkable with pupils equal round and reactive to light and accommodation.   Neck without adenopathy, thyromegaly, JVD nor bruit.   Lungs slightly diminished to auscultation and percussion.  Cardiac regular rate and rhythm no murmur.   Abdomen nontender, without hepatosplenomegaly and no mass appreciable.   Extremities without clubbing cyanosis nor edema.   Neurologic grossly intact with symmetric tone and strength and reflex.  Skin without gross abnormality     Results:     Lab Results   Component Value Date    WBC 11.4 01/16/2025    HGB 13.9 01/16/2025    HCT 39.8 01/16/2025    .0 01/16/2025    CREATSERUM 1.04 01/16/2025    BUN 19 01/16/2025     01/16/2025    K 4.3 01/16/2025    K 4.3 01/16/2025     01/16/2025    CO2 25.0 01/16/2025    GLU 96 01/16/2025    CA 9.0 01/16/2025    PTT 38.7 01/15/2025       Rajiv Keen MD  Medical Director, Critical Care, [facility]  Medical Director, [facility]  Pager: [phone]

## 2025-01-16 NOTE — PHYSICAL THERAPY NOTE
PHYSICAL THERAPY EVALUATION - INPATIENT     Room Number: 573/573-A  Evaluation Date: 1/16/2025  Type of Evaluation: Initial   Physician Order: PT Eval and Treat    Presenting Problem: SOB and cough w/hemoptysis,chest pain  Co-Morbidities : hypothyroidism, basal cell CA, OA, Left ELKE 2024 per pt  Reason for Therapy: Mobility Dysfunction and Discharge Planning    PHYSICAL THERAPY ASSESSMENT   Patient is a 69 year old male admitted 1/13/2025 for significant cough w/blood and short of breath with exertion and chest pain, found to have PE and on heparin drip, now transitioned to oral meds.  Prior to admission, patient's baseline is independent with a RW at times, no device at home unless hip pain is bad. He lives alone in a hotel and manages his needs.  Patient is currently functioning near baseline with bed mobility, transfers, gait, stair negotiation, maintaining seated position, standing prolonged periods, and performing household tasks. He reports his home walker is broken so will be vended a new walker at discharge. RN aware    PLAN  Patient has been evaluated and presents with no skilled Physical Therapy needs at this time.  Patient will be discharged from Physical Therapy services. Please re-order if a new functional limitation presents during this admission.    PT Device Recommendation: Rolling walker    PHYSICAL THERAPY MEDICAL/SOCIAL HISTORY   History related to current admission: Phoenix Luna is a 69 year old male with PMH of hypothyroidism, basal cell carcinoma, arthritis who presented with hemoptysis X 3 days.  Stated he was coughing up small amounts of blood along with right-sided chest pain, but thought it was muscle strain due to weight lifting, and thought maybe he got sick from the gym.  At baseline patient states he is sedentary spending most of his time on the bed or couch due to severe osteoarthritic hip pain.  States he compensates for his immobility by going to the gym daily and lifting weights  upper body.  Denies smoking, recent travel.  Was supposed to see dermatology for basal cell carcinoma on his forehead.  Uses a walker.  Is currently living in a hotel because he is in between houses, and not because he is homeless he states.                In ED, tachycardic .  Patient had wheezing on exam was given DuoNebs prednisone with initial concern for bronchitis.  However D-dimer elevated and CT PE revealed segmental PE with pulmonary infarct.  Heparin drip started.  Due to hemoptysis felt uncomfortable sending him home on DOAC, so admitted to medical team with pulmonary consult     Problem List  Principal Problem:    Other acute pulmonary embolism without acute cor pulmonale (HCC)  Active Problems:    Pulmonary embolism and infarction (HCC)    Pulmonary embolism (HCC)      HOME SITUATION  Type of Home: Other (Comment) (hotel)  Home Layout: One level;Elevator  Stairs to Enter : 0        Stairs to Bedroom: 0         Lives With: Alone        Patient Regularly Uses: Glasses;Rolling walker      Prior Level of [place]: Patient lives in a hotel alone, uses a RW at baseline. He is able to walk to gym and manage his home but is limited by bilateral hip pain. He reports he needs to get his other hip done    SUBJECTIVE  \"I would like to see how I feel walking in the halls\"    PHYSICAL THERAPY EXAMINATION   OBJECTIVE  Precautions: Bed/chair alarm  Fall Risk: Standard fall risk    PAIN ASSESSMENT  Ratin  Location: hips and low back - his usual pain, no chest pain  Management Techniques: Activity promotion;Repositioning    COGNITION  Overall Cognitive Status:  WFL - within functional limits    RANGE OF MOTION AND STRENGTH ASSESSMENT  Upper extremity ROM and strength are within functional limits   Lower extremity ROM is limited for bilateral hip rotation, full extension  Lower extremity strength is within functional limits, limited to resistance bilateral hip ext    BALANCE  Static Sitting: Normal  Dynamic  Sitting: Good  Static Standing: Fair +  Dynamic Standing: Poor +    ACTIVITY TOLERANCE  Pulse: 96  Heart Rate Source: Monitor  Resp: 25  BP: 117/84 (after walking (initial at /79))  BP Location: Right arm  BP Method: Automatic  Patient Position: Sitting    O2 WALK  Oxygen Therapy  SPO2% on Room Air at Rest: 94  SPO2% Ambulation on Room Air: 98    AM-PAC '6-Clicks' INPATIENT SHORT FORM - BASIC MOBILITY  How much difficulty does the patient currently have...  Patient Difficulty: Turning over in bed (including adjusting bedclothes, sheets and blankets)?: None   Patient Difficulty: Sitting down on and standing up from a chair with arms (e.g., wheelchair, bedside commode, etc.): None   Patient Difficulty: Moving from lying on back to sitting on the side of the bed?: None   How much help from another person does the patient currently need...   Help from Another: Moving to and from a bed to a chair (including a wheelchair)?: None   Help from Another: Need to walk in hospital room?: None   Help from Another: Climbing 3-5 steps with a railing?: A Little     AM-PAC Score:  Raw Score: 23   Approx Degree of Impairment: 11.2%   Standardized Score (AM-PAC Scale): 56.93   CMS Modifier (G-Code): CI    FUNCTIONAL ABILITY STATUS  Functional Mobility/Gait Assessment  Gait Assistance: Modified independent  Distance (ft): 450  Assistive Device: Rolling walker  Pattern: Shuffle (mildly forward flexed, shorter steps due to hip pain BLE)  Rolling: independent  Supine to Sit: modified independent  Sit to Supine: modified independent  Sit to Stand: independent    Exercise/Education Provided:  Body mechanics  Energy conservation  Functional activity tolerated  Gait training  Posture  Transfer training    Skilled Therapy Provided: RN approves participation in therapy session. Patient presents in bed and agreeable to therapy session. He is able to perform all tasks with adapted techniques for bed mobility due to hip pain but is steady on  his feet and has good safety awareness. Education and training for energy conservation, deep breathing and pacing/resting as needed if he feels short of breath. SpO2 improves with walking to 98% and pt feels he is working mild to moderately, vitals respond appropriately to activity. He is able to pace appropriately by end of session. He is needing a RW and his is bent and not working correctly so a new walker will be vended to patient. He is returned to bed with all needs in reach and RN aware.    The patient's Approx Degree of Impairment: 11.2% has been calculated based on documentation in the [facility] '6 clicks' Inpatient Basic Mobility Short Form.  Research supports that patients with this level of impairment may benefit from home with no services, at baseline from PT perspective but may benefit from home RN to help with medication management as pt has a lot of questions.  Final disposition will be made by interdisciplinary medical team.    Patient End of Session: In bed;Needs met;Call light within reach;RN aware of session/findings;All patient questions and concerns addressed;Hospital anti-slip socks;Alarm set    Patient was able to achieve the following ...   Patient able to transfer  At previous, functional level    Patient able to ambulate on level surfaces  At previous, functional level     Patient Evaluation Complexity Level:  History High - 3 or more personal factors and/or co-morbidities   Examination of body systems Moderate - addressing a total of 3 or more elements   Clinical Presentation Low- Stable   Clinical Decision Making  Low Complexity     Gait Trainin minutes  Therapeutic Activity:  13 minutes

## 2025-01-23 ENCOUNTER — TELEPHONE (OUTPATIENT)
Dept: PULMONOLOGY | Facility: CLINIC | Age: 70
End: 2025-01-23

## 2025-01-23 NOTE — TELEPHONE ENCOUNTER
Patient called to schedule hospital follow up appointment.  No appointments available.  Please call.

## 2025-02-06 NOTE — PROGRESS NOTES
The patient is a 69-year-old male who I know well from recent hospitalization when he presented with hemoptysis associated with pulmonary emboli.  The lower extremity studies were negative for clot.  He is doing much better now and has been no further spitting up blood.  Breathing is improved.    Review of Systems:  Vision normal. Ear nose and throat normal. Bowel normal. Bladder function normal. No depression. No thyroid disease. No lymphatic system concerns.  No rash. Muscles and joints notable and that he uses a walker. No weight loss no weight gain.    Physical Examination:  Vital signs normal. HEENT examination is unremarkable with pupils equal round and reactive to light and accommodation. Neck without adenopathy, thyromegaly, JVD nor bruit. Lungs clear to auscultation and percussion. Cardiac regular rate and rhythm no murmur. Abdomen nontender, without hepatosplenomegaly and no mass appreciable. Extremities and Musculoskeletal without clubbing cyanosis nor edema, and mobility notable and that he uses a walker. Neurologic grossly intact with symmetric tone and strength and reflex.    Assessment and plan:  1.  Venous thromboembolism-doing much better clinically.  No further hemoptysis.  Breathing is good.    Recommendations: Continue 6 months of full dose Xarelto, patient to see me in the office at the 5-month interval or sooner if needed and will make a decision regarding decreased dose of Xarelto at that juncture.  He developed the clots in association with a gait impairment and neurologic concerns and I am concerned that he would be at risk for recurrence if I stop altogether after 6 months.  Will discuss at that time.

## 2025-03-03 NOTE — TELEPHONE ENCOUNTER
Patient needs refill on a blood clot medicine does not have the name but is out of the medicine please follow up [id number] - [place], [place] - [address] [place] [address] [phone], [phone] and please call patient when medicine is sent

## (undated) NOTE — ED AVS SNAPSHOT
[facility] Emergency Department    [address]     [address] [place] [address]    Phone:  [phone]    Fax:  [phone]           Raj Saleh   MRN: [id number]    Department:  [facility] Emergency Department   Date of Visit:  6/9 covered by your plan. Please contact your insurance company to determine coverage and benefits available for follow-up care and referrals.       If you have difficulty scheduling your follow-up appointment as directed, please call our  at ([phone]) If you believe that any of the medications or instructions on this list is different from what your Primary Care doctor has instructed you - please continue to take your medications as instructed by your Primary Care doctor until you can check with your do coverage. Patient [address] is a Federal Navigator program that can help with your Affordable Care Act coverage, as well as all types of Medicaid plans.   To get signed up and covered, please call [phone] and ask to get set up for an insuran

## (undated) NOTE — ED AVS SNAPSHOT
[facility] Emergency Department    [address]     [address] [place] [address]    Phone:  [phone]    Fax:  [phone]           Wong Courser   MRN: [id number]    Department:  [facility] Emergency Department   Date of Visit:  6/5 and Class Registration line at [phone] or find a doctor online by visiting [email or web address].    IF THERE IS ANY CHANGE OR WORSENING OF YOUR CONDITION, CALL YOUR PRIMARY CARE PHYSICIAN AT ONCE OR RETURN IMMEDIATELY TO [address] [place] [address].     If

## (undated) NOTE — ED AVS SNAPSHOT
[facility] Emergency Department    [address]     [address] [place] [address]    Phone:  [phone]    Fax:  [phone]           [facility]   MRN: [id number]    Department:  [facility] Emergency Department   Date of Visit:  6/5 It is our goal to assure that you are completely satisfied with every aspect of your visit today.   In an effort to constantly improve our service to you, we would appreciate any positive or negative feedback related to the care you received in our emergenc [employer] account. You may have had testing done that requires us to contact you. Please make sure we have your correct phone number on file.       I certified that I have received a copy of the aftercare instructions; that these instructions have been expl Click[employer] will allow you to access patient instructions from your recent visit,  view other health information, and more. To sign up or find more information, go to [email or web address] and click on the Sign Up Now link in the [employer] box.      Enter